# Patient Record
Sex: FEMALE | Race: WHITE | Employment: FULL TIME | URBAN - METROPOLITAN AREA
[De-identification: names, ages, dates, MRNs, and addresses within clinical notes are randomized per-mention and may not be internally consistent; named-entity substitution may affect disease eponyms.]

---

## 2017-01-04 PROBLEM — F41.9 ANXIETY: Status: ACTIVE | Noted: 2017-01-04

## 2017-04-11 PROBLEM — B37.31 VAGINAL YEAST INFECTION: Status: ACTIVE | Noted: 2017-04-11

## 2017-05-02 ENCOUNTER — APPOINTMENT (OUTPATIENT)
Dept: ULTRASOUND IMAGING | Age: 27
End: 2017-05-02
Payer: COMMERCIAL

## 2017-05-02 ENCOUNTER — APPOINTMENT (OUTPATIENT)
Dept: CT IMAGING | Age: 27
End: 2017-05-02
Payer: COMMERCIAL

## 2017-05-02 ENCOUNTER — HOSPITAL ENCOUNTER (OUTPATIENT)
Age: 27
Setting detail: OBSERVATION
Discharge: HOME | End: 2017-05-04
Attending: STUDENT IN AN ORGANIZED HEALTH CARE EDUCATION/TRAINING PROGRAM | Admitting: INTERNAL MEDICINE
Payer: COMMERCIAL

## 2017-05-02 ENCOUNTER — APPOINTMENT (OUTPATIENT)
Dept: GENERAL RADIOLOGY | Age: 27
End: 2017-05-02
Attending: INTERNAL MEDICINE
Payer: COMMERCIAL

## 2017-05-02 DIAGNOSIS — N71.9 ENDOMETRITIS: Primary | ICD-10-CM

## 2017-05-02 DIAGNOSIS — N76.0 BV (BACTERIAL VAGINOSIS): ICD-10-CM

## 2017-05-02 DIAGNOSIS — B96.89 BV (BACTERIAL VAGINOSIS): ICD-10-CM

## 2017-05-02 DIAGNOSIS — R10.9 INTRACTABLE ABDOMINAL PAIN: ICD-10-CM

## 2017-05-02 LAB
ALBUMIN SERPL BCP-MCNC: 4 G/DL (ref 3.5–5)
ALBUMIN/GLOB SERPL: 1 {RATIO} (ref 1.2–3.5)
ALP SERPL-CCNC: 72 U/L (ref 50–136)
ALT SERPL-CCNC: 28 U/L (ref 12–65)
ANION GAP BLD CALC-SCNC: 6 MMOL/L (ref 7–16)
APPEARANCE UR: CLEAR
AST SERPL W P-5'-P-CCNC: 20 U/L (ref 15–37)
BASOPHILS # BLD AUTO: 0 K/UL (ref 0–0.2)
BASOPHILS # BLD: 0 % (ref 0–2)
BILIRUB SERPL-MCNC: 0.3 MG/DL (ref 0.2–1.1)
BILIRUB UR QL: NEGATIVE
BUN SERPL-MCNC: 14 MG/DL (ref 6–23)
CALCIUM SERPL-MCNC: 9.1 MG/DL (ref 8.3–10.4)
CHLORIDE SERPL-SCNC: 104 MMOL/L (ref 98–107)
CO2 SERPL-SCNC: 28 MMOL/L (ref 21–32)
COLOR UR: YELLOW
CREAT SERPL-MCNC: 0.84 MG/DL (ref 0.6–1)
CRP SERPL-MCNC: 1 MG/DL (ref 0–0.9)
DIFFERENTIAL METHOD BLD: NORMAL
EOSINOPHIL # BLD: 0.1 K/UL (ref 0–0.8)
EOSINOPHIL NFR BLD: 1 % (ref 0.5–7.8)
ERYTHROCYTE [DISTWIDTH] IN BLOOD BY AUTOMATED COUNT: 13.2 % (ref 11.9–14.6)
ERYTHROCYTE [DISTWIDTH] IN BLOOD BY AUTOMATED COUNT: 13.3 % (ref 11.9–14.6)
GLOBULIN SER CALC-MCNC: 4.1 G/DL (ref 2.3–3.5)
GLUCOSE SERPL-MCNC: 88 MG/DL (ref 65–100)
GLUCOSE UR STRIP.AUTO-MCNC: NEGATIVE MG/DL
HCG UR QL: NEGATIVE
HCT VFR BLD AUTO: 35.4 % (ref 35.8–46.3)
HCT VFR BLD AUTO: 39.4 % (ref 35.8–46.3)
HEMOCCULT STL QL: NEGATIVE
HGB BLD-MCNC: 12 G/DL (ref 11.7–15.4)
HGB BLD-MCNC: 13.1 G/DL (ref 11.7–15.4)
HGB UR QL STRIP: NEGATIVE
IMM GRANULOCYTES # BLD: 0 K/UL (ref 0–0.5)
IMM GRANULOCYTES NFR BLD AUTO: 0 % (ref 0–5)
KETONES UR QL STRIP.AUTO: 15 MG/DL
LACTATE BLD-SCNC: 1.3 MMOL/L (ref 0.5–1.9)
LACTATE SERPL-SCNC: 0.6 MMOL/L (ref 0.4–2)
LEUKOCYTE ESTERASE UR QL STRIP.AUTO: NEGATIVE
LIPASE SERPL-CCNC: 99 U/L (ref 73–393)
LYMPHOCYTES # BLD AUTO: 40 % (ref 13–44)
LYMPHOCYTES # BLD: 1.8 K/UL (ref 0.5–4.6)
MCH RBC QN AUTO: 29.3 PG (ref 26.1–32.9)
MCH RBC QN AUTO: 29.6 PG (ref 26.1–32.9)
MCHC RBC AUTO-ENTMCNC: 33.2 G/DL (ref 31.4–35)
MCHC RBC AUTO-ENTMCNC: 33.9 G/DL (ref 31.4–35)
MCV RBC AUTO: 87.4 FL (ref 79.6–97.8)
MCV RBC AUTO: 88.1 FL (ref 79.6–97.8)
MONOCYTES # BLD: 0.4 K/UL (ref 0.1–1.3)
MONOCYTES NFR BLD AUTO: 10 % (ref 4–12)
NEUTS SEG # BLD: 2.1 K/UL (ref 1.7–8.2)
NEUTS SEG NFR BLD AUTO: 49 % (ref 43–78)
NITRITE UR QL STRIP.AUTO: NEGATIVE
PH UR STRIP: 6.5 [PH] (ref 5–9)
PLATELET # BLD AUTO: 195 K/UL (ref 150–450)
PLATELET # BLD AUTO: 227 K/UL (ref 150–450)
PMV BLD AUTO: 10.6 FL (ref 10.8–14.1)
PMV BLD AUTO: 10.9 FL (ref 10.8–14.1)
POTASSIUM SERPL-SCNC: 3.6 MMOL/L (ref 3.5–5.1)
PROT SERPL-MCNC: 8.1 G/DL (ref 6.3–8.2)
PROT UR STRIP-MCNC: NEGATIVE MG/DL
RBC # BLD AUTO: 4.05 M/UL (ref 4.05–5.25)
RBC # BLD AUTO: 4.47 M/UL (ref 4.05–5.25)
SERVICE CMNT-IMP: NORMAL
SODIUM SERPL-SCNC: 138 MMOL/L (ref 136–145)
SP GR UR REFRACTOMETRY: 1.07 (ref 1–1.02)
UROBILINOGEN UR QL STRIP.AUTO: 0.2 EU/DL (ref 0.2–1)
WBC # BLD AUTO: 4.4 K/UL (ref 4.3–11.1)
WBC # BLD AUTO: 5.8 K/UL (ref 4.3–11.1)
WET PREP GENITAL: NORMAL

## 2017-05-02 PROCEDURE — 71010 XR CHEST PORT: CPT

## 2017-05-02 PROCEDURE — 87210 SMEAR WET MOUNT SALINE/INK: CPT | Performed by: PHYSICIAN ASSISTANT

## 2017-05-02 PROCEDURE — 74011250637 HC RX REV CODE- 250/637: Performed by: PHYSICIAN ASSISTANT

## 2017-05-02 PROCEDURE — 74011636320 HC RX REV CODE- 636/320: Performed by: STUDENT IN AN ORGANIZED HEALTH CARE EDUCATION/TRAINING PROGRAM

## 2017-05-02 PROCEDURE — 74011250637 HC RX REV CODE- 250/637: Performed by: INTERNAL MEDICINE

## 2017-05-02 PROCEDURE — G0378 HOSPITAL OBSERVATION PER HR: HCPCS

## 2017-05-02 PROCEDURE — 83690 ASSAY OF LIPASE: CPT | Performed by: PHYSICIAN ASSISTANT

## 2017-05-02 PROCEDURE — 83605 ASSAY OF LACTIC ACID: CPT | Performed by: INTERNAL MEDICINE

## 2017-05-02 PROCEDURE — 99218 HC RM OBSERVATION: CPT

## 2017-05-02 PROCEDURE — 81025 URINE PREGNANCY TEST: CPT

## 2017-05-02 PROCEDURE — 74177 CT ABD & PELVIS W/CONTRAST: CPT

## 2017-05-02 PROCEDURE — 96376 TX/PRO/DX INJ SAME DRUG ADON: CPT

## 2017-05-02 PROCEDURE — 96361 HYDRATE IV INFUSION ADD-ON: CPT | Performed by: PHYSICIAN ASSISTANT

## 2017-05-02 PROCEDURE — 85025 COMPLETE CBC W/AUTO DIFF WBC: CPT | Performed by: PHYSICIAN ASSISTANT

## 2017-05-02 PROCEDURE — 74011000250 HC RX REV CODE- 250: Performed by: INTERNAL MEDICINE

## 2017-05-02 PROCEDURE — 36415 COLL VENOUS BLD VENIPUNCTURE: CPT | Performed by: INTERNAL MEDICINE

## 2017-05-02 PROCEDURE — 76856 US EXAM PELVIC COMPLETE: CPT

## 2017-05-02 PROCEDURE — 85027 COMPLETE CBC AUTOMATED: CPT | Performed by: INTERNAL MEDICINE

## 2017-05-02 PROCEDURE — 96372 THER/PROPH/DIAG INJ SC/IM: CPT

## 2017-05-02 PROCEDURE — 80053 COMPREHEN METABOLIC PANEL: CPT | Performed by: PHYSICIAN ASSISTANT

## 2017-05-02 PROCEDURE — 81003 URINALYSIS AUTO W/O SCOPE: CPT | Performed by: INTERNAL MEDICINE

## 2017-05-02 PROCEDURE — 86140 C-REACTIVE PROTEIN: CPT | Performed by: PHYSICIAN ASSISTANT

## 2017-05-02 PROCEDURE — 99285 EMERGENCY DEPT VISIT HI MDM: CPT | Performed by: PHYSICIAN ASSISTANT

## 2017-05-02 PROCEDURE — 87491 CHLMYD TRACH DNA AMP PROBE: CPT | Performed by: PHYSICIAN ASSISTANT

## 2017-05-02 PROCEDURE — 96375 TX/PRO/DX INJ NEW DRUG ADDON: CPT

## 2017-05-02 PROCEDURE — 74011250636 HC RX REV CODE- 250/636: Performed by: PHYSICIAN ASSISTANT

## 2017-05-02 PROCEDURE — 82270 OCCULT BLOOD FECES: CPT

## 2017-05-02 PROCEDURE — 81003 URINALYSIS AUTO W/O SCOPE: CPT | Performed by: PHYSICIAN ASSISTANT

## 2017-05-02 PROCEDURE — 74011000258 HC RX REV CODE- 258: Performed by: PHYSICIAN ASSISTANT

## 2017-05-02 PROCEDURE — 74011250636 HC RX REV CODE- 250/636: Performed by: INTERNAL MEDICINE

## 2017-05-02 PROCEDURE — 96365 THER/PROPH/DIAG IV INF INIT: CPT | Performed by: PHYSICIAN ASSISTANT

## 2017-05-02 PROCEDURE — 96375 TX/PRO/DX INJ NEW DRUG ADDON: CPT | Performed by: PHYSICIAN ASSISTANT

## 2017-05-02 PROCEDURE — 83605 ASSAY OF LACTIC ACID: CPT

## 2017-05-02 PROCEDURE — 74011000258 HC RX REV CODE- 258: Performed by: STUDENT IN AN ORGANIZED HEALTH CARE EDUCATION/TRAINING PROGRAM

## 2017-05-02 RX ORDER — HEPARIN SODIUM 5000 [USP'U]/ML
5000 INJECTION, SOLUTION INTRAVENOUS; SUBCUTANEOUS EVERY 12 HOURS
Status: DISCONTINUED | OUTPATIENT
Start: 2017-05-02 | End: 2017-05-04 | Stop reason: HOSPADM

## 2017-05-02 RX ORDER — AZITHROMYCIN 250 MG/1
1000 TABLET, FILM COATED ORAL
Status: COMPLETED | OUTPATIENT
Start: 2017-05-02 | End: 2017-05-02

## 2017-05-02 RX ORDER — SODIUM CHLORIDE 9 MG/ML
100 INJECTION, SOLUTION INTRAVENOUS CONTINUOUS
Status: DISCONTINUED | OUTPATIENT
Start: 2017-05-02 | End: 2017-05-04 | Stop reason: HOSPADM

## 2017-05-02 RX ORDER — TRAZODONE HYDROCHLORIDE 50 MG/1
150 TABLET ORAL
Status: DISCONTINUED | OUTPATIENT
Start: 2017-05-02 | End: 2017-05-04 | Stop reason: HOSPADM

## 2017-05-02 RX ORDER — KETOROLAC TROMETHAMINE 10 MG/1
10 TABLET, FILM COATED ORAL
Qty: 15 TAB | Refills: 0 | Status: SHIPPED | OUTPATIENT
Start: 2017-05-02 | End: 2017-05-04

## 2017-05-02 RX ORDER — SODIUM CHLORIDE 0.9 % (FLUSH) 0.9 %
10 SYRINGE (ML) INJECTION
Status: COMPLETED | OUTPATIENT
Start: 2017-05-02 | End: 2017-05-02

## 2017-05-02 RX ORDER — ONDANSETRON 2 MG/ML
4 INJECTION INTRAMUSCULAR; INTRAVENOUS
Status: COMPLETED | OUTPATIENT
Start: 2017-05-02 | End: 2017-05-02

## 2017-05-02 RX ORDER — DOXYCYCLINE HYCLATE 100 MG
100 TABLET ORAL 2 TIMES DAILY
Qty: 20 TAB | Refills: 0 | Status: SHIPPED | OUTPATIENT
Start: 2017-05-02 | End: 2017-05-12

## 2017-05-02 RX ORDER — OXYCODONE HYDROCHLORIDE 5 MG/1
5 TABLET ORAL
Status: DISCONTINUED | OUTPATIENT
Start: 2017-05-02 | End: 2017-05-04 | Stop reason: HOSPADM

## 2017-05-02 RX ORDER — HYOSCYAMINE SULFATE 0.12 MG/1
0.12 TABLET SUBLINGUAL 3 TIMES DAILY
Status: DISCONTINUED | OUTPATIENT
Start: 2017-05-02 | End: 2017-05-04 | Stop reason: HOSPADM

## 2017-05-02 RX ORDER — ONDANSETRON 2 MG/ML
4 INJECTION INTRAMUSCULAR; INTRAVENOUS
Status: DISCONTINUED | OUTPATIENT
Start: 2017-05-02 | End: 2017-05-02

## 2017-05-02 RX ORDER — HYDROMORPHONE HYDROCHLORIDE 1 MG/ML
1 INJECTION, SOLUTION INTRAMUSCULAR; INTRAVENOUS; SUBCUTANEOUS
Status: COMPLETED | OUTPATIENT
Start: 2017-05-02 | End: 2017-05-02

## 2017-05-02 RX ORDER — FAMOTIDINE 10 MG/ML
20 INJECTION INTRAVENOUS EVERY 12 HOURS
Status: DISCONTINUED | OUTPATIENT
Start: 2017-05-02 | End: 2017-05-04 | Stop reason: HOSPADM

## 2017-05-02 RX ORDER — METRONIDAZOLE 500 MG/1
500 TABLET ORAL 2 TIMES DAILY
Qty: 20 TAB | Refills: 0 | Status: SHIPPED | OUTPATIENT
Start: 2017-05-02 | End: 2017-05-04

## 2017-05-02 RX ORDER — MORPHINE SULFATE 2 MG/ML
2 INJECTION, SOLUTION INTRAMUSCULAR; INTRAVENOUS
Status: DISCONTINUED | OUTPATIENT
Start: 2017-05-02 | End: 2017-05-04 | Stop reason: HOSPADM

## 2017-05-02 RX ORDER — ONDANSETRON 2 MG/ML
4 INJECTION INTRAMUSCULAR; INTRAVENOUS
Status: DISCONTINUED | OUTPATIENT
Start: 2017-05-02 | End: 2017-05-04 | Stop reason: HOSPADM

## 2017-05-02 RX ORDER — ONDANSETRON 8 MG/1
8 TABLET, ORALLY DISINTEGRATING ORAL
Status: COMPLETED | OUTPATIENT
Start: 2017-05-02 | End: 2017-05-02

## 2017-05-02 RX ORDER — KETOROLAC TROMETHAMINE 30 MG/ML
30 INJECTION, SOLUTION INTRAMUSCULAR; INTRAVENOUS
Status: COMPLETED | OUTPATIENT
Start: 2017-05-02 | End: 2017-05-02

## 2017-05-02 RX ADMIN — SODIUM CHLORIDE 1000 ML: 900 INJECTION, SOLUTION INTRAVENOUS at 09:47

## 2017-05-02 RX ADMIN — CEFTRIAXONE 1 G: 1 INJECTION, POWDER, FOR SOLUTION INTRAMUSCULAR; INTRAVENOUS at 15:20

## 2017-05-02 RX ADMIN — ONDANSETRON 4 MG: 2 INJECTION INTRAMUSCULAR; INTRAVENOUS at 22:43

## 2017-05-02 RX ADMIN — HYOSCYAMINE SULFATE 0.12 MG: 0.12 TABLET ORAL at 20:42

## 2017-05-02 RX ADMIN — ONDANSETRON 4 MG: 2 INJECTION INTRAMUSCULAR; INTRAVENOUS at 12:36

## 2017-05-02 RX ADMIN — HEPARIN SODIUM 5000 UNITS: 5000 INJECTION, SOLUTION INTRAVENOUS; SUBCUTANEOUS at 20:42

## 2017-05-02 RX ADMIN — FAMOTIDINE 20 MG: 10 INJECTION, SOLUTION INTRAVENOUS at 20:42

## 2017-05-02 RX ADMIN — ONDANSETRON 8 MG: 8 TABLET, ORALLY DISINTEGRATING ORAL at 15:28

## 2017-05-02 RX ADMIN — KETOROLAC TROMETHAMINE 30 MG: 30 INJECTION, SOLUTION INTRAMUSCULAR at 09:48

## 2017-05-02 RX ADMIN — AZITHROMYCIN 1000 MG: 250 TABLET, FILM COATED ORAL at 15:20

## 2017-05-02 RX ADMIN — Medication 2 MG: at 22:43

## 2017-05-02 RX ADMIN — ONDANSETRON 4 MG: 2 INJECTION INTRAMUSCULAR; INTRAVENOUS at 18:29

## 2017-05-02 RX ADMIN — Medication 10 ML: at 13:20

## 2017-05-02 RX ADMIN — Medication 2 MG: at 18:25

## 2017-05-02 RX ADMIN — TRAZODONE HYDROCHLORIDE 150 MG: 50 TABLET ORAL at 22:44

## 2017-05-02 RX ADMIN — HYDROMORPHONE HYDROCHLORIDE 1 MG: 1 INJECTION, SOLUTION INTRAMUSCULAR; INTRAVENOUS; SUBCUTANEOUS at 12:36

## 2017-05-02 RX ADMIN — SODIUM CHLORIDE 100 ML: 900 INJECTION, SOLUTION INTRAVENOUS at 13:20

## 2017-05-02 RX ADMIN — IOVERSOL 100 ML: 741 INJECTION INTRA-ARTERIAL; INTRAVENOUS at 13:19

## 2017-05-02 RX ADMIN — SODIUM CHLORIDE 100 ML/HR: 900 INJECTION, SOLUTION INTRAVENOUS at 18:26

## 2017-05-02 NOTE — Clinical Note
Return to the ED if any change or worse in anyway. No intercourse for at least ten days. Finish all the antibiotics and see Dr. Danielito Ayers this week for follow up. We will call you if the cultures show we need to change or add antibiotics.

## 2017-05-02 NOTE — H&P
[address]   HISTORY AND PHYSICAL       Name:  Sheba Mallory   MR#:  [id number]   :  1990   Account #:  [de-identified]   Date of Adm:  2017       TIME OF ADMISSION: 4 p.m. CHIEF COMPLAINT: Abdominal pain. HISTORY OF PRESENT ILLNESS: This is a 75-year-old female patient   who has a past medical history of interstitial cystitis,   previous cholecystectomy and endometrial ablation due to severe   vaginal bleeding 1 year ago who came into the emergency room   today with a chief complaint of abdominal pain. According to the patient, she has been having dull abdominal   pain localized in the right flank and right pelvic area for   about 5 days, however, the pain has not been extremely severe. This morning when she was taking a shower she had severe right   flank and pelvic pain to the point that she had to lay in bed   for several minutes. She felt very nauseated and she felt like   she was about to pass out. Her pain slightly improved and she   decided to go to work. While she was driving her car, she had to   pull over because of the severe pain and once she reached the   place where she works, she had another episode of severe pain   and she had a near syncopal episode. Because of that reason, her   manager decided to bring her into the emergency room. When she   arrived here, her vital signs were blood pressure 118/84, heart   rate 113, respiratory rate of 16, O2 saturation of 98%. She was   awake and alert and complaining of severe pain. Her initial   blood workup reported normal white blood cell count, normal   hemoglobin, normal lactic acid, negative pregnancy test. Her   kidney function was within normal limits. Lipase was normal and   liver function panel was within normal limits. Rapid urinary   test was done and did not show evidence of any infection.  Pelvic   exam was done and it was reported that she had pain in the right   adnexa; however, there was no evidence of purulent vaginal   secretion. Wet preparation was done and reported only 10-20   white blood cells with no evidence of any parasite. A CT scan of   the abdomen and pelvis was done and did not show evidence of any   acute intra-abdominal pathology. A pelvic ultrasound was done   and reported uterine fibroid, but no evidence of any acute   disease. General Surgery and Gynecology reviewed her images and   it was considered that she was not having any surgical situation   and for that reason, it was recommended that the patient was   placed under observation in the hospitalist service. REVIEW OF SYSTEMS: A review of 14 systems was performed and it   was negative except for the findings that are reported in the   HPI. PAST MEDICAL HISTORY   1. Anxiety. 2. Adult attention deficit hyperactivity disorder. 3. Cholecystitis. 4. Depression. 5. GERD. 6. History of endometrial ablation. 7. Vaginal yeast infection. 8. Interstitial cystitis. PAST SURGICAL HISTORY   1. Cholecystectomy. 2. Tubal ligation. 3. Colposcopy. SOCIAL HISTORY: Denies smoking, alcohol use or illicit drug use. FAMILY HISTORY: Positive for colon cancer, ovarian cancer,   thyroid disease. ALLERGIES: PHENERGAN. PHYSICAL EXAMINATION   VITAL SIGNS: Blood pressure 100/60, heart rate 72, respiratory   rate of 18, O2 saturation of 95%. EYES: PERRLA. EARS, NOSE, MOUTH AND THROAT: There is no evidence of pharyngeal   erythema, edema or purulent exudates. RESPIRATORY: Clear breath sounds bilaterally. CARDIOVASCULAR: Regular rate and rhythm with no murmurs. GASTROINTESTINAL: Abdomen is soft, but she has tenderness in the   right flank and right pelvic area. GENITOURINARY: Unremarkable. MUSCULOSKELETAL: No evidence of trauma. SKIN: No evidence of active skin lesions. NEUROLOGIC: The patient is alert and oriented x3, with no   evidence of focal weakness. PSYCHIATRIC: Normal mood. HEMATOLOGIC/LYMPHATIC/IMMUNOLOGIC: No evidence of active   bleeding. No abnormal lymphadenopathy. LABORATORIES AND IMAGE RESULTS: White blood cell count 4.4,   hemoglobin 13.1, platelet count 298. Sodium 138, potassium 3.6,   chloride 104, anion gap 6, glucose 88, BUN 14, creatinine 0.8,   calcium 9.1, bilirubin 0.3, ALT 28, AST 20, lipase 91. Wet   preparation, 10-20 white blood cells. CT scan of the abdomen and   pelvis, no evidence of any acute intra-abdominal pathology. Chest x-ray pending to be done. EKG pending to be done. ASSESSMENT: This is a 38-year-old female patient who came into   the emergency room with severe abdominal pain. She is still   having active abdominal pain, but no source of the problem has   been found. She is at high risk of further complications. She   will be placed under observation and her initial length of stay   is calculated to be less than 2 midnights. PLAN   1. Abdominal pain. Unclear etiology. The patient has a normal   white blood cell count, normal lactic acid, a normal CT scan of   abdomen and pelvis and does not seem like she is having a   dangerous condition. Appropriate urinalysis has been ordered and   I will also order antimuscarinic oral medication to rule out the   possibility of severe irritable bowel syndrome. We will repeat   another CBC and lactic acid at night and will repeat labs in the   morning. The patient will be kept on a clear liquid diet. 2. History of depression. We will resume her regular home   medication which is Vyvanse. She has been advised to bring her   home medication. She is using trazodone 150 mg at night. 3. Deep vein thrombosis prophylaxis. Subcutaneous heparin.         MD Giulia Cox / HOSEA   D:  05/02/2017   16:53   T:  05/02/2017   17:24   Job #:  [id number]

## 2017-05-02 NOTE — ED TRIAGE NOTES
Right flank pain for about one week but much worse this morning. Pt denies urinary symptoms. States she is also feeling nauseated.

## 2017-05-02 NOTE — LETTER
[facility] EMERGENCY DEPT 
[facility] [address] [place] [id number] [phone] Work/School Note Date: 5/2/2017 To Whom It May concern: 
 
Roscoe Arevalo was seen and treated today in the emergency room by the following provider(s): 
Attending Provider: Joss Nunez DO Physician Assistant: CYNTHIA Barboza. Danny Majano may return to work on 05/04/17. Sincerely, CYNTHIA Barboza

## 2017-05-02 NOTE — IP AVS SNAPSHOT
Antonia Christopher 
 
 
 [address] [facility] [address] [facility] 
[phone] Patient: Jonathon Scanlon MRN: [id number] VFV:[id number]/83/1855 You are allergic to the following Allergen Reactions Phenergan (Promethazine) Anaphylaxis Recent Documentation Height Weight Breastfeeding? BMI OB Status Smoking Status 1.6 m 77.1 kg No 30.11 kg/m2 Ablation Never Smoker Emergency Contacts Name Discharge Info Relation Home Work Mobile Mukesh Arevalo DISCHARGE CAREGIVER [3] Spouse [3] [phone] Zack Brenner  Sister [23] [phone] About your hospitalization You were admitted on:  May 2, 2017 You last received care in the:  [address] You were discharged on: May 4, 2017 Unit phone number:  [phone] Why you were hospitalized Your primary diagnosis was:  Not on File Providers Seen During Your Hospitalizations Provider Role Specialty Primary office phone Karla Howard DO Attending Provider Emergency Medicine [phone] Amanda Patiño MD Attending Provider Internal Medicine [phone] Your Primary Care Physician (PCP) Primary Care Physician Office Phone Office Fax Gitaleroy Griffinolivier [phone] Follow-up Information Follow up With Details Comments Contact Info Amanda Patiño MD   [address] [facility] 
[phone] Misbah Barrett MD In 1 week APPT. MAY 11th @ 1:40 [address] Primary Care [facility] [address] 
[phone] Your Appointments Thursday May 11, 2017  1:40 PM EDT Follow Up with Misbah Barrett MD  
[facility] Primary Care [facility]) [address] [place] [address] [address] [place] [id number] [phone] Current Discharge Medication List  
  
START taking these medications Dose & Instructions Dispensing Information Comments Morning Noon Evening Bedtime  
 docusate sodium 100 mg capsule Commonly known as:  Aixa Nicholas Your next dose is:  Tomorrow Dose:  100 mg Take 1 Cap by mouth daily for 30 days. Quantity:  30 Cap Refills:  0  
     
   
   
   
  
 doxycycline 100 mg tablet Commonly known as:  VIBRA-TABS Your next dose is: Today Dose:  100 mg Take 1 Tab by mouth two (2) times a day for 10 days. Quantity:  20 Tab Refills:  0  
     
   
   
  
   
  
 fluconazole 150 mg tablet Commonly known as:  DIFLUCAN Dose:  150 mg Take 1 Tab by mouth once for 1 dose. FDA advises cautious prescribing of oral fluconazole in pregnancy. Quantity:  1 Tab Refills:  0  
     
   
   
   
  
 hyoscyamine SL 0.125 mg SL tablet Commonly known as:  LEVSIN/SL Your next dose is: Today Take 1 tablet 3 times daily before meals for 3 days, then  Take 1 tablet 2 times daily before meals for 3 days, then  Take 1 tablet 1 time daily before breakfast for 3 days, then stop or continue  Similar medication as per PCP instruction. Quantity:  18 Tab Refills:  0  
     
   
  
   
  
   
  
 ondansetron 4 mg disintegrating tablet Commonly known as:  ZOFRAN ODT Dose:  4 mg Take 1 Tab by mouth every eight (8) hours as needed for Nausea. Quantity:  30 Tab Refills:  1  
     
   
   
   
  
 oxyCODONE IR 5 mg immediate release tablet Commonly known as:  Magdi  Dose:  5 mg Take 1 Tab by mouth every eight (8) hours as needed. Max Daily Amount: 15 mg. Use only for severe pain Quantity:  30 Tab Refills:  0 CONTINUE these medications which have NOT CHANGED Dose & Instructions Dispensing Information Comments Morning Noon Evening Bedtime Lisdexamfetamine 50 mg Cap Commonly known as:  VYVANSE Your next dose is:  Tomorrow Dose:  50 mg Take 1 Cap (50 mg total) by mouth daily. Max Daily Amount: 50 mg  
 Quantity:  30 Cap Refills:  0  
     
   
   
   
  
 traZODone 150 mg tablet Commonly known as:  Timbo Granger Your next dose is: Today Dose:  150 mg Take 1 Tab by mouth nightly. Quantity:  30 Tab Refills:  5 STOP taking these medications DULoxetine 60 mg capsule Commonly known as:  CYMBALTA Mth-Me Blue-Sod Phos-PhSal-Hyo 118-10-40.8-36 mg Cap capsule Commonly known as:  Jayson Flash Where to Get Your Medications These medications were sent to [address] [place] [address] Hours:  24-hours Phone:  [phone]  
  doxycycline 100 mg tablet Information on where to get these meds will be given to you by the nurse or doctor. ! Ask your nurse or doctor about these medications  
  docusate sodium 100 mg capsule  
 fluconazole 150 mg tablet  
 hyoscyamine SL 0.125 mg SL tablet  
 ondansetron 4 mg disintegrating tablet  
 oxyCODONE IR 5 mg immediate release tablet Discharge Instructions DISCHARGE SUMMARY from Nurse The following personal items are in your possession at time of discharge: 
 
Dental Appliances: None Visual Aid: None Home Medications: None Jewelry: None Clothing: Pants, Shirt, Footwear, Socks Other Valuables: Cell Phone PATIENT INSTRUCTIONS: 
 
After general anesthesia or intravenous sedation, for 24 hours or while taking prescription Narcotics: · Limit your activities · Do not drive and operate hazardous machinery · Do not make important personal or business decisions · Do  not drink alcoholic beverages · If you have not urinated within 8 hours after discharge, please contact your surgeon on call. Report the following to your surgeon: 
· Excessive pain, swelling, redness or odor of or around the surgical area · Temperature over 100.5 · Nausea and vomiting lasting longer than 4 hours or if unable to take medications · Any signs of decreased circulation or nerve impairment to extremity: change in color, persistent  numbness, tingling, coldness or increase pain · Any questions What to do at Home: 
Recommended activity: Activity as tolerated, per MD 
 
If you experience any of the following symptoms provided to and reviewed with patient. fever>101, pain unrelieved with medication, nausea/vomiting, shortness of breath, dizziness/fainting, chest pain. . , please follow up with your doctor. *  Please give a list of your current medications to your Primary Care Provider. *  Please update this list whenever your medications are discontinued, doses are 
    changed, or new medications (including over-the-counter products) are added. *  Please carry medication information at all times in case of emergency situations. These are general instructions for a healthy lifestyle: No smoking/ No tobacco products/ Avoid exposure to second hand smoke Surgeon General's Warning:  Quitting smoking now greatly reduces serious risk to your health. Obesity, smoking, and sedentary lifestyle greatly increases your risk for illness A healthy diet, regular physical exercise & weight monitoring are important for maintaining a healthy lifestyle You may be retaining fluid if you have a history of heart failure or if you experience any of the following symptoms:  Weight gain of 3 pounds or more overnight or 5 pounds in a week, increased swelling in our hands or feet or shortness of breath while lying flat in bed. Please call your doctor as soon as you notice any of these symptoms; do not wait until your next office visit. Recognize signs and symptoms of STROKE: 
 
F-face looks uneven A-arms unable to move or move unevenly S-speech slurred or non-existent T-time-call 911 as soon as signs and symptoms begin-DO NOT go  
 Back to bed or wait to see if you get better-TIME IS BRAIN. Warning Signs of HEART ATTACK Call 911 if you have these symptoms: 
? Chest discomfort. Most heart attacks involve discomfort in the center of the chest that lasts more than a few minutes, or that goes away and comes back. It can feel like uncomfortable pressure, squeezing, fullness, or pain. ? Discomfort in other areas of the upper body. Symptoms can include pain or discomfort in one or both arms, the back, neck, jaw, or stomach. ? Shortness of breath with or without chest discomfort. ? Other signs may include breaking out in a cold sweat, nausea, or lightheadedness. Don't wait more than five minutes to call [address]! Fast action can save your life. Calling 911 is almost always the fastest way to get lifesaving treatment. Emergency Medical Services staff can begin treatment when they arrive  up to an hour sooner than if someone gets to the hospital by car. The discharge information has been reviewed with the patient. The patient verbalized understanding. Discharge medications reviewed with the patient and appropriate educational materials and side effects teaching were provided. Bacterial Vaginosis: Care Instructions Your Care Instructions Bacterial vaginosis is a type of vaginal infection. It is caused by excess growth of certain bacteria that are normally found in the vagina. Symptoms can include itching, swelling, pain when you urinate or have sex, and a gray or yellow discharge with a \"fishy\" odor. It is not considered an infection that is spread through sexual contact. Although symptoms can be annoying and uncomfortable, bacterial vaginosis does not usually cause other health problems. However, if you have it while you are pregnant, it can cause complications.  
While the infection may go away on its own, most doctors use antibiotics to treat it. You may have been prescribed pills or vaginal cream. With treatment, bacterial vaginosis usually clears up in 5 to 7 days. Follow-up care is a key part of your treatment and safety. Be sure to make and go to all appointments, and call your doctor if you are having problems. It's also a good idea to know your test results and keep a list of the medicines you take. How can you care for yourself at home? · Take your antibiotics as directed. Do not stop taking them just because you feel better. You need to take the full course of antibiotics. · Do not eat or drink anything that contains alcohol if you are taking metronidazole (Flagyl). · Keep using your medicine if you start your period. Use pads instead of tampons while using a vaginal cream or suppository. Tampons can absorb the medicine. · Wear loose cotton clothing. Do not wear nylon and other materials that hold body heat and moisture close to the skin. · Do not scratch. Relieve itching with a cold pack or a cool bath. · Do not wash your vaginal area more than once a day. Use plain water or a mild, unscented soap. Do not douche. When should you call for help? Watch closely for changes in your health, and be sure to contact your doctor if: 
· You have unexpected vaginal bleeding. · You have a fever. · You have new or increased pain in your vagina or pelvis. · You are not getting better after 1 week. · Your symptoms return after you finish the course of your medicine. Where can you learn more? Go to http://kaushik-gaby.info/. Cheryl Witt in the search box to learn more about \"Bacterial Vaginosis: Care Instructions. \" Current as of: October 13, 2016 Content Version: 11.2 © 4007-7904 [employer]. Care instructions adapted under license by [employer] (which disclaims liability or warranty for this information).  If you have questions about a medical condition or this instruction, always ask your healthcare professional. [address] any warranty or liability for your use of this information. Pelvic Inflammatory Disease: Care Instructions Your Care Instructions Pelvic inflammatory disease, or PID, is an infection of a womans fallopian tubes and other reproductive organs. PID is usually caused by a sexually transmitted infection (STI), such as gonorrhea or chlamydia. PID can cause scars in the fallopian tubes, making it hard for a woman to get pregnant. Having one STI increases your risk for other STIs, such as genital herpes, genital warts, syphilis, and HIV. It is important to take all the medicine that was prescribed. PID can cause serious health problems if you do not complete your treatment. Follow-up care is a key part of your treatment and safety. Be sure to make and go to all appointments, and call your doctor if you are having problems. Its also a good idea to know your test results and keep a list of the medicines you take. How can you care for yourself at home? · Take your antibiotics as directed. Do not stop taking them just because you feel better. You need to take the full course of antibiotics. · Rest until your fever and pain have improved. · Take pain medicines exactly as directed. ¨ If the doctor gave you a prescription medicine for pain, take it as prescribed. ¨ If you are not taking a prescription pain medicine, ask your doctor if you can take an over-the-counter medicine. · Use a hot water bottle or a heating pad (set on low) on your belly for pain. · Do not douche. · Do not have sex or use tampons (you can use pads instead) until you have taken all the medicine, your pain is gone, and you feel completely well. · Talk to any sex partners you have had in the past 2 months. They need to be tested and may need to be treated for STIs. To prevent STIs · Use latex condoms every time you have sex. Use them from the beginning to the end of sexual contact. · Talk to your partner before you have sex. Find out if he or she has or is at risk for any sexually transmitted infection (STI). Keep in mind that a person may be able to spread an STI even if he or she does not have symptoms. · Do not have sex with anyone who has symptoms of an STI, such as sores on the genitals or mouth. · Having one sex partner (who does not have STIs and does not have sex with anyone else) is a good way to avoid STIs. When should you call for help? Call your doctor now or seek immediate medical care if: 
· You have a new or higher fever. · Your pain gets worse. · You think you may be pregnant. Watch closely for changes in your health, and be sure to contact your doctor if: 
· You vomit or have diarrhea. · You are not getting better after 2 days. Where can you learn more? Go to http://kaushik-gaby.info/. Enter N294 in the search box to learn more about \"Pelvic Inflammatory Disease: Care Instructions. \" Current as of: October 13, 2016 Content Version: 11.2 © 0549-9881 [employer]. Care instructions adapted under license by [employer] (which disclaims liability or warranty for this information). If you have questions about a medical condition or this instruction, always ask your healthcare professional. [address] any warranty or liability for your use of this information. Discharge Orders None Introducing [facility] & HE[facility] SERVICES! Dear Gilbert Abel: Thank you for requesting a [employer] account. Our records indicate that you already have an active [employer] account. You can access your account anytime at [email or web address] Did you know that you can access your hospital and ER discharge instructions at any time in [employer]?   You can also review all of your test results from your hospital stay or ER visit. Additional Information If you have questions, please visit the Frequently Asked Questions section of the [employer] website at [email or web address] Remember, MyChart is NOT to be used for urgent needs. For medical emergencies, dial 911. Now available from your iPhone and Android! General Information Please provide this summary of care documentation to your next provider. Patient Signature:  ____________________________________________________________ Date:  ____________________________________________________________  
  
[facility] Provider Signature:  ____________________________________________________________ Date:  ____________________________________________________________

## 2017-05-02 NOTE — ED NOTES
TRANSFER - OUT REPORT:    Verbal report given to Sherry Fernandez RN on Aryan Sharpe  being transferred to [address] [place] [address] for routine progression of care       Report consisted of patients Situation, Background, Assessment and   Recommendations(SBAR). Information from the following report(s) ED Summary was reviewed with the receiving nurse. Lines:   Peripheral IV 05/02/17 Left Antecubital (Active)   Site Assessment Clean, dry, & intact 5/2/2017  9:37 AM   Phlebitis Assessment 0 5/2/2017  9:37 AM   Infiltration Assessment 0 5/2/2017  9:37 AM   Dressing Status Clean, dry, & intact 5/2/2017  9:37 AM   Dressing Type Tape;Transparent 5/2/2017  9:37 AM   Hub Color/Line Status Pink 5/2/2017  9:37 AM   Action Taken Blood drawn 5/2/2017  9:37 AM        Opportunity for questions and clarification was provided.       Patient transported with:   [employer]

## 2017-05-02 NOTE — ED PROVIDER NOTES
HPI Comments: Patient is here with some right lower quadrant pelvic pain that started about a week ago. She states that she has had one episode of nausea and vomiting due to the pain and the pain became significantly worse this morning at work. She felt like she was going to pass out due to the pain. She has had some increased vaginal discharge but no trouble with urination, hematuria, diarrhea, constipation, chest pain, shortness of breath, dizziness, weakness, fever or other symptoms. Her supervisor brought her in from work this morning and her  is here with her now. She did have a uterine ablation last year for heavy uterine bleeding as well as a cholecystectomy. She still has her appendix. Patient has had no swelling or weakness of her arms or legs and was ambulatory to the room without difficulty and well-hydrated. Patient is a 32 y.o. female presenting with pelvic pain. The history is provided by the patient. Pelvic Pain    This is a new problem. The current episode started more than 2 days ago. The problem occurs constantly. The problem has been gradually worsening. The pain is associated with vomiting. The pain is located in the RLQ. Associated symptoms include back pain. Pertinent negatives include no anorexia, no fever, no belching, no diarrhea, no flatus, no hematochezia, no melena, no nausea, no vomiting, no constipation, no dysuria, no frequency, no hematuria, no headaches, no arthralgias, no myalgias, no trauma and no chest pain. Nothing worsens the pain. The pain is relieved by nothing.         Past Medical History:   Diagnosis Date    Abnormal Pap smear of cervix 7/28/2016    Acalculous cholecystitis 3/3/2016    resolved with surgery     ADD (attention deficit disorder)     Adult ADHD 7/28/2016    Anxiety     Bowel trouble 7/28/2016    Depression     GERD (gastroesophageal reflux disease)     no meds currently     H/O seasonal allergies     History of chlamydia 7/28/2016  History of prior ablation treatment     pelvic    History of recurrent UTIs 7/28/2016    Insomnia 6/24/2016    Nausea & vomiting     Obese     bmi =32.8    Vaginal yeast infection 4/11/2017       Past Surgical History:   Procedure Laterality Date    HX CHOLECYSTECTOMY  3/16    HX COLPOSCOPY      HX TUBAL LIGATION  8/2012    HX WISDOM TEETH EXTRACTION           Family History:   Problem Relation Age of Onset    Thyroid Disease Mother     Breast Cancer Mother     Ovarian Cancer Mother     Ovarian Cancer Sister     Colon Cancer Maternal Grandmother        Social History     Social History    Marital status:      Spouse name: N/A    Number of children: N/A    Years of education: N/A     Occupational History    Not on file. Social History Main Topics    Smoking status: Never Smoker    Smokeless tobacco: Never Used    Alcohol use No    Drug use: No    Sexual activity: Yes     Partners: Male     Birth control/ protection: Surgical     Other Topics Concern    Not on file     Social History Narrative         ALLERGIES: Phenergan [promethazine]    Review of Systems   Constitutional: Negative. Negative for fever. HENT: Negative. Eyes: Negative. Respiratory: Negative. Cardiovascular: Negative. Negative for chest pain. Gastrointestinal: Negative. Negative for anorexia, constipation, diarrhea, flatus, hematochezia, melena, nausea and vomiting. Genitourinary: Positive for pelvic pain and vaginal discharge. Negative for decreased urine volume, difficulty urinating, dyspareunia, dysuria, enuresis, flank pain, frequency, genital sores, hematuria, menstrual problem, urgency, vaginal bleeding and vaginal pain. Musculoskeletal: Positive for back pain. Negative for arthralgias and myalgias. Skin: Negative. Neurological: Negative. Negative for headaches. Psychiatric/Behavioral: Negative. All other systems reviewed and are negative.       Vitals:    05/02/17 0859   BP: 118/84   Pulse: (!) 113   Resp: 16   Temp: 98 °F (36.7 °C)   SpO2: 100%   Weight: 77.1 kg (170 lb)   Height: 5' 3\" (1.6 m)            Physical Exam   Constitutional: She is oriented to person, place, and time. She appears well-developed and well-nourished. HENT:   Head: Normocephalic and atraumatic. Right Ear: External ear normal.   Left Ear: External ear normal.   Nose: Nose normal.   Mouth/Throat: Oropharynx is clear and moist.   Eyes: Conjunctivae and EOM are normal. Pupils are equal, round, and reactive to light. Neck: Normal range of motion. Neck supple. Cardiovascular: Normal rate, regular rhythm, normal heart sounds and intact distal pulses. Pulmonary/Chest: Effort normal and breath sounds normal.   Abdominal: Soft. Bowel sounds are normal.   Genitourinary: Rectal exam shows external hemorrhoid. Rectal exam shows no internal hemorrhoid, no fissure, no mass, no tenderness, anal tone normal and guaiac negative stool. There is no tenderness on the right labia. There is no tenderness on the left labia. Uterus is not deviated, not enlarged, not fixed and not tender. Cervix exhibits motion tenderness and discharge. Cervix exhibits no friability. Right adnexum displays tenderness (Palpation of the right adenexa reproduces the pain. ). Right adnexum displays no mass and no fullness. Left adnexum displays no mass, no tenderness and no fullness. Vaginal discharge found. Musculoskeletal: Normal range of motion. Neurological: She is alert and oriented to person, place, and time. She has normal reflexes. Skin: Skin is warm and dry. Psychiatric: She has a normal mood and affect. Her behavior is normal. Judgment and thought content normal.   Nursing note and vitals reviewed.        MDM  Number of Diagnoses or Management Options     Amount and/or Complexity of Data Reviewed  Clinical lab tests: ordered and reviewed  Tests in the radiology section of CPT®: ordered and reviewed  Discuss the patient with other providers: yes (Dr. Shravan Crocker)    Risk of Complications, Morbidity, and/or Mortality  Presenting problems: moderate  Diagnostic procedures: moderate  Management options: moderate      ED Course       Procedures    10:01 AM Spoke with Dr. Shravan Crocker regarding patient. 12:15 PM Patient is feeling a little better but would like something stronger for pain. I have informed her of the normal ultrasound results and the need for a CAT scan at this time. Patient is resting comfortably in the room. I have given her two warm blankets as well. 2:18 PM CT is back and normal, discussed patient again with Dr. Shravan Crocker and have asked him to go and examine patient. He is in there now. 2:35 PM Spoke with Dr. Winnie Bob regarding patient. Will call Dr. Marii Petersen. 2:43 PM Spoke with Dr. Marii Petersen regarding patient. He would like her to have pain medication and to see how she does with that as an outpatient. He suspects she probably had a cyst that ruptured and to send her home with pain medication and they will see her this week in the office. We will send her with antibiotics for the BV. She should return here sooner if worse in anyway or any change. 2:48 PM I have had surgery paged to run this by them. 2:52 PM Dr. Shravan Crocker reviewed CT and did not see any abnormalities. 3:05 PM Spoke with Dr. Rafael Hubbard, he is reviewing the CT scan now. He feels the uterus looks abnormal, it is enhancing abnormally. He feels she needs treatment for endometritis and follow up with GYN. Return to the ED if worsening or any change. Will treat with Doxycycline and flagyl and give rocephin and zithromax here prior to discharge  The patient was observed in the ED.     Results Reviewed:      Recent Results (from the past 24 hour(s))   HCG URINE, QL. - POC    Collection Time: 05/02/17  9:16 AM   Result Value Ref Range    Pregnancy test,urine (POC) NEGATIVE  NEG     WET PREP    Collection Time: 05/02/17  9:25 AM   Result Value Ref Range    Special Requests: NO SPECIAL REQUESTS      Wet prep 10 TO 20 WBC/HPF     Wet prep FEW  CLUE CELLS PRESENT        Wet prep NO YEAST SEEN      Wet prep NO TRICHOMONAS SEEN     CBC WITH AUTOMATED DIFF    Collection Time: 05/02/17  9:35 AM   Result Value Ref Range    WBC 4.4 4.3 - 11.1 K/uL    RBC 4.47 4.05 - 5.25 M/uL    HGB 13.1 11.7 - 15.4 g/dL    HCT 39.4 35.8 - 46.3 %    MCV 88.1 79.6 - 97.8 FL    MCH 29.3 26.1 - 32.9 PG    MCHC 33.2 31.4 - 35.0 g/dL    RDW 13.3 11.9 - 14.6 %    PLATELET 873 355 - 959 K/uL    MPV 10.9 10.8 - 14.1 FL    DF AUTOMATED      NEUTROPHILS 49 43 - 78 %    LYMPHOCYTES 40 13 - 44 %    MONOCYTES 10 4.0 - 12.0 %    EOSINOPHILS 1 0.5 - 7.8 %    BASOPHILS 0 0.0 - 2.0 %    IMMATURE GRANULOCYTES 0.0 0.0 - 5.0 %    ABS. NEUTROPHILS 2.1 1.7 - 8.2 K/UL    ABS. LYMPHOCYTES 1.8 0.5 - 4.6 K/UL    ABS. MONOCYTES 0.4 0.1 - 1.3 K/UL    ABS. EOSINOPHILS 0.1 0.0 - 0.8 K/UL    ABS. BASOPHILS 0.0 0.0 - 0.2 K/UL    ABS. IMM. GRANS. 0.0 0.0 - 0.5 K/UL   METABOLIC PANEL, COMPREHENSIVE    Collection Time: 05/02/17  9:35 AM   Result Value Ref Range    Sodium 138 136 - 145 mmol/L    Potassium 3.6 3.5 - 5.1 mmol/L    Chloride 104 98 - 107 mmol/L    CO2 28 21 - 32 mmol/L    Anion gap 6 (L) 7 - 16 mmol/L    Glucose 88 65 - 100 mg/dL    BUN 14 6 - 23 MG/DL    Creatinine 0.84 0.6 - 1.0 MG/DL    GFR est AA >60 >60 ml/min/1.73m2    GFR est non-AA >60 >60 ml/min/1.73m2    Calcium 9.1 8.3 - 10.4 MG/DL    Bilirubin, total 0.3 0.2 - 1.1 MG/DL    ALT (SGPT) 28 12 - 65 U/L    AST (SGOT) 20 15 - 37 U/L    Alk.  phosphatase 72 50 - 136 U/L    Protein, total 8.1 6.3 - 8.2 g/dL    Albumin 4.0 3.5 - 5.0 g/dL    Globulin 4.1 (H) 2.3 - 3.5 g/dL    A-G Ratio 1.0 (L) 1.2 - 3.5     LIPASE    Collection Time: 05/02/17  9:35 AM   Result Value Ref Range    Lipase 99 73 - 393 U/L   C REACTIVE PROTEIN, QT    Collection Time: 05/02/17  9:35 AM   Result Value Ref Range    C-Reactive protein 1.0 (H) 0.0 - 0.9 mg/dL   POC LACTIC ACID    Collection Time: 05/02/17  9:39 AM Result Value Ref Range    Lactic Acid (POC) 1.3 0.5 - 1.9 mmol/L   POC FECAL OCCULT BLOOD    Collection Time: 05/02/17  9:41 AM   Result Value Ref Range    Occult blood, stool (POC) NEGATIVE  NEG       CT ABD PELV W CONT   Final Result   IMPRESSION: No acute abdominal findings. US PELV NON OB W TV   Final Result   IMPRESSION:      1. 1.7 cm uterine nodule, likely a fibroid. Correlate with gynecological   history. 2. No adnexal masses. 3:26 PM I have spoke with Dr. Jerrell Sever regarding patient and he will admit patient due to intractable pain, exam inconsistent with blood work and diagnostic findings. He will come see her now. Rao Sood

## 2017-05-03 LAB
ANION GAP BLD CALC-SCNC: 9 MMOL/L (ref 7–16)
BUN SERPL-MCNC: 8 MG/DL (ref 6–23)
C TRACH RRNA SPEC QL NAA+PROBE: NEGATIVE
CALCIUM SERPL-MCNC: 7.9 MG/DL (ref 8.3–10.4)
CHLORIDE SERPL-SCNC: 106 MMOL/L (ref 98–107)
CO2 SERPL-SCNC: 26 MMOL/L (ref 21–32)
CREAT SERPL-MCNC: 0.83 MG/DL (ref 0.6–1)
ERYTHROCYTE [DISTWIDTH] IN BLOOD BY AUTOMATED COUNT: 12.9 % (ref 11.9–14.6)
GLUCOSE SERPL-MCNC: 74 MG/DL (ref 65–100)
HCT VFR BLD AUTO: 35.4 % (ref 35.8–46.3)
HGB BLD-MCNC: 11.5 G/DL (ref 11.7–15.4)
LACTATE SERPL-SCNC: 0.6 MMOL/L (ref 0.4–2)
MAGNESIUM SERPL-MCNC: 1.9 MG/DL (ref 1.8–2.4)
MCH RBC QN AUTO: 28.8 PG (ref 26.1–32.9)
MCHC RBC AUTO-ENTMCNC: 32.5 G/DL (ref 31.4–35)
MCV RBC AUTO: 88.5 FL (ref 79.6–97.8)
N GONORRHOEA RRNA SPEC QL NAA+PROBE: NEGATIVE
PLATELET # BLD AUTO: 198 K/UL (ref 150–450)
PMV BLD AUTO: 10.7 FL (ref 10.8–14.1)
POTASSIUM SERPL-SCNC: 3.9 MMOL/L (ref 3.5–5.1)
RBC # BLD AUTO: 4 M/UL (ref 4.05–5.25)
SODIUM SERPL-SCNC: 141 MMOL/L (ref 136–145)
SPECIMEN SOURCE: NORMAL
WBC # BLD AUTO: 3.5 K/UL (ref 4.3–11.1)

## 2017-05-03 PROCEDURE — 80048 BASIC METABOLIC PNL TOTAL CA: CPT | Performed by: INTERNAL MEDICINE

## 2017-05-03 PROCEDURE — 83605 ASSAY OF LACTIC ACID: CPT | Performed by: INTERNAL MEDICINE

## 2017-05-03 PROCEDURE — 85027 COMPLETE CBC AUTOMATED: CPT | Performed by: INTERNAL MEDICINE

## 2017-05-03 PROCEDURE — 74011250636 HC RX REV CODE- 250/636: Performed by: INTERNAL MEDICINE

## 2017-05-03 PROCEDURE — G0378 HOSPITAL OBSERVATION PER HR: HCPCS

## 2017-05-03 PROCEDURE — 96372 THER/PROPH/DIAG INJ SC/IM: CPT

## 2017-05-03 PROCEDURE — 96376 TX/PRO/DX INJ SAME DRUG ADON: CPT

## 2017-05-03 PROCEDURE — 74011000250 HC RX REV CODE- 250: Performed by: INTERNAL MEDICINE

## 2017-05-03 PROCEDURE — 83735 ASSAY OF MAGNESIUM: CPT | Performed by: INTERNAL MEDICINE

## 2017-05-03 PROCEDURE — 96366 THER/PROPH/DIAG IV INF ADDON: CPT

## 2017-05-03 PROCEDURE — 74011250637 HC RX REV CODE- 250/637: Performed by: INTERNAL MEDICINE

## 2017-05-03 PROCEDURE — 99218 HC RM OBSERVATION: CPT

## 2017-05-03 PROCEDURE — 74011000258 HC RX REV CODE- 258: Performed by: INTERNAL MEDICINE

## 2017-05-03 PROCEDURE — 36415 COLL VENOUS BLD VENIPUNCTURE: CPT | Performed by: INTERNAL MEDICINE

## 2017-05-03 RX ORDER — DOCUSATE SODIUM 100 MG/1
100 CAPSULE, LIQUID FILLED ORAL DAILY
Status: DISCONTINUED | OUTPATIENT
Start: 2017-05-04 | End: 2017-05-04 | Stop reason: HOSPADM

## 2017-05-03 RX ADMIN — TRAZODONE HYDROCHLORIDE 150 MG: 50 TABLET ORAL at 22:18

## 2017-05-03 RX ADMIN — OXYCODONE HYDROCHLORIDE 5 MG: 5 TABLET ORAL at 10:00

## 2017-05-03 RX ADMIN — ONDANSETRON 4 MG: 2 INJECTION INTRAMUSCULAR; INTRAVENOUS at 06:43

## 2017-05-03 RX ADMIN — FAMOTIDINE 20 MG: 10 INJECTION, SOLUTION INTRAVENOUS at 22:10

## 2017-05-03 RX ADMIN — ONDANSETRON 4 MG: 2 INJECTION INTRAMUSCULAR; INTRAVENOUS at 10:00

## 2017-05-03 RX ADMIN — Medication 2 MG: at 06:43

## 2017-05-03 RX ADMIN — HYOSCYAMINE SULFATE 0.12 MG: 0.12 TABLET ORAL at 22:18

## 2017-05-03 RX ADMIN — FAMOTIDINE 20 MG: 10 INJECTION, SOLUTION INTRAVENOUS at 08:59

## 2017-05-03 RX ADMIN — HEPARIN SODIUM 5000 UNITS: 5000 INJECTION, SOLUTION INTRAVENOUS; SUBCUTANEOUS at 08:58

## 2017-05-03 RX ADMIN — HYOSCYAMINE SULFATE 0.12 MG: 0.12 TABLET ORAL at 08:58

## 2017-05-03 RX ADMIN — HEPARIN SODIUM 5000 UNITS: 5000 INJECTION, SOLUTION INTRAVENOUS; SUBCUTANEOUS at 22:11

## 2017-05-03 RX ADMIN — ONDANSETRON 4 MG: 2 INJECTION INTRAMUSCULAR; INTRAVENOUS at 14:02

## 2017-05-03 RX ADMIN — CEFTRIAXONE 2 G: 2 INJECTION, POWDER, FOR SOLUTION INTRAMUSCULAR; INTRAVENOUS at 08:59

## 2017-05-03 RX ADMIN — ONDANSETRON 4 MG: 2 INJECTION INTRAMUSCULAR; INTRAVENOUS at 17:41

## 2017-05-03 RX ADMIN — HYOSCYAMINE SULFATE 0.12 MG: 0.12 TABLET ORAL at 17:14

## 2017-05-03 RX ADMIN — ONDANSETRON 4 MG: 2 INJECTION INTRAMUSCULAR; INTRAVENOUS at 22:11

## 2017-05-03 RX ADMIN — OXYCODONE HYDROCHLORIDE 5 MG: 5 TABLET ORAL at 02:05

## 2017-05-03 RX ADMIN — SODIUM CHLORIDE 100 ML/HR: 900 INJECTION, SOLUTION INTRAVENOUS at 06:48

## 2017-05-03 NOTE — PROGRESS NOTES
Pt complaining of nausea. Not yet time for Zofran. Dr. Evelina Nunez notified. Order received to change frequency to every 4 hours PRN.

## 2017-05-03 NOTE — PROGRESS NOTES
Assessment complete via flow sheet. Patient alert and oriented X 4. Respirations even and unlabored. Breath sounds clear. Abd soft and tender with active BS. Patient denies pain or nausea. IVF infusing with out difficulty. Family at bedside. Patient instructed to call with needs. Bed low/locked with call light with in reach.

## 2017-05-03 NOTE — PROGRESS NOTES
Progress Note    Patient: Regina De Los Santos MRN: [id number]  SSN: [id number]    YOB: 1990  Age: 32 y.o. Sex: female      Admit Date: 5/2/2017    LOS: 0 days     Subjective:     31 YO female admitted with abdominal pain. Unclear etiology. Possibly severe IBS. Objective:     Vitals:    05/03/17 0743 05/03/17 0956 05/03/17 1157 05/03/17 1510   BP: (!) 87/48 93/59 101/61 90/55   Pulse: 79  70 68   Resp: 16  16 16   Temp: 98.4 °F (36.9 °C)  97.5 °F (36.4 °C) 98.3 °F (36.8 °C)   SpO2: 96%  98% 100%   Weight:       Height:            Intake and Output:  Current Shift:    Last three shifts: 05/01 1901 - 05/03 0700  In: 911 [I.V.:911]  Out: 600 [Urine:600]    Physical Exam:   GENERAL: alert, cooperative, no distress, appears stated age  EYE: conjunctivae/corneas clear. PERRL, EOM's intact. Fundi benign  LYMPHATIC: Cervical, supraclavicular, and axillary nodes normal.   THROAT & NECK: normal and no erythema or exudates noted. LUNG: clear to auscultation bilaterally  HEART: regular rate and rhythm, S1, S2 normal, no murmur, click, rub or gallop  ABDOMEN: mild tenderness in the R flank   EXTREMITIES:  extremities normal, atraumatic, no cyanosis or edema  SKIN: Normal.  NEUROLOGIC: AOx3. Gait normal. Reflexes and motor strength normal and symmetric. Cranial nerves 2-12 and sensation grossly intact. PSYCHIATRIC: non focal    Lab/Data Review:  Lab results reviewed. For significant abnormal values and values requiring intervention, see assessment and plan. Assessment:     Active Problems:    * No active hospital problems. *      Plan:     1. Abdominal pain. Unclear etiology. -stable VS  -Blood workup is unremarkable   -high suspicion for severe IBS   -continue oral pain meds  -advanced to GI soft diet/ambulation was encouraged     2. History of depression. We will resume her regular home   medication which is Vyvanse. She has been advised to bring her   home medication.  She is using trazodone 150 mg at night. 3. Deep vein thrombosis prophylaxis.  Subcutaneous heparin.       Signed By: Arnulfo Zavala MD     May 3, 2017

## 2017-05-03 NOTE — PROGRESS NOTES
Encouraged ambulation. Pt stated that she would sit up in the chair for lunch and ambulate the halls after lunch. Will continue to monitor.

## 2017-05-03 NOTE — PROGRESS NOTES
Pt requested nausea medication prior to eating lunch administered zofran IV Per MD order, will continue to monitor.

## 2017-05-03 NOTE — PROGRESS NOTES
2 mg of Morphine given slow IVP for abd pain rated at a 7/10. 4 mg of Zofran given slow IVP for nausea. See MAR.

## 2017-05-03 NOTE — PROGRESS NOTES
Pt c/o 8/10 pain administered Roxicodone PO per MD order, will continue to monitor. Pt also c/o nausea administered zofran IV per MD order, will continue to monitor.

## 2017-05-03 NOTE — PROGRESS NOTES
Zofran ineffective for nausea. Pt vomited. Declines offer for RN to call doctor for additional orders.

## 2017-05-04 VITALS
OXYGEN SATURATION: 97 % | WEIGHT: 170 LBS | SYSTOLIC BLOOD PRESSURE: 98 MMHG | HEART RATE: 81 BPM | BODY MASS INDEX: 30.12 KG/M2 | TEMPERATURE: 97.5 F | RESPIRATION RATE: 16 BRPM | HEIGHT: 63 IN | DIASTOLIC BLOOD PRESSURE: 58 MMHG

## 2017-05-04 LAB
ALBUMIN SERPL BCP-MCNC: 3.2 G/DL (ref 3.5–5)
ALBUMIN/GLOB SERPL: 1 {RATIO} (ref 1.2–3.5)
ALP SERPL-CCNC: 58 U/L (ref 50–136)
ALT SERPL-CCNC: 21 U/L (ref 12–65)
ANION GAP BLD CALC-SCNC: 8 MMOL/L (ref 7–16)
AST SERPL W P-5'-P-CCNC: 19 U/L (ref 15–37)
BILIRUB DIRECT SERPL-MCNC: 0.1 MG/DL
BILIRUB SERPL-MCNC: 0.3 MG/DL (ref 0.2–1.1)
BUN SERPL-MCNC: 10 MG/DL (ref 6–23)
CALCIUM SERPL-MCNC: 8.3 MG/DL (ref 8.3–10.4)
CHLORIDE SERPL-SCNC: 108 MMOL/L (ref 98–107)
CO2 SERPL-SCNC: 26 MMOL/L (ref 21–32)
CREAT SERPL-MCNC: 0.82 MG/DL (ref 0.6–1)
ERYTHROCYTE [DISTWIDTH] IN BLOOD BY AUTOMATED COUNT: 12.9 % (ref 11.9–14.6)
GLOBULIN SER CALC-MCNC: 3.3 G/DL (ref 2.3–3.5)
GLUCOSE SERPL-MCNC: 88 MG/DL (ref 65–100)
HCT VFR BLD AUTO: 36 % (ref 35.8–46.3)
HGB BLD-MCNC: 12 G/DL (ref 11.7–15.4)
MAGNESIUM SERPL-MCNC: 2.1 MG/DL (ref 1.8–2.4)
MCH RBC QN AUTO: 29.3 PG (ref 26.1–32.9)
MCHC RBC AUTO-ENTMCNC: 33.3 G/DL (ref 31.4–35)
MCV RBC AUTO: 88 FL (ref 79.6–97.8)
PLATELET # BLD AUTO: 207 K/UL (ref 150–450)
PMV BLD AUTO: 10.6 FL (ref 10.8–14.1)
POTASSIUM SERPL-SCNC: 3.9 MMOL/L (ref 3.5–5.1)
PROT SERPL-MCNC: 6.5 G/DL (ref 6.3–8.2)
RBC # BLD AUTO: 4.09 M/UL (ref 4.05–5.25)
SODIUM SERPL-SCNC: 142 MMOL/L (ref 136–145)
WBC # BLD AUTO: 5.1 K/UL (ref 4.3–11.1)

## 2017-05-04 PROCEDURE — G0378 HOSPITAL OBSERVATION PER HR: HCPCS

## 2017-05-04 PROCEDURE — 83735 ASSAY OF MAGNESIUM: CPT | Performed by: INTERNAL MEDICINE

## 2017-05-04 PROCEDURE — 96372 THER/PROPH/DIAG INJ SC/IM: CPT

## 2017-05-04 PROCEDURE — 96376 TX/PRO/DX INJ SAME DRUG ADON: CPT

## 2017-05-04 PROCEDURE — 74011250636 HC RX REV CODE- 250/636: Performed by: INTERNAL MEDICINE

## 2017-05-04 PROCEDURE — 36415 COLL VENOUS BLD VENIPUNCTURE: CPT | Performed by: INTERNAL MEDICINE

## 2017-05-04 PROCEDURE — 74011000250 HC RX REV CODE- 250: Performed by: INTERNAL MEDICINE

## 2017-05-04 PROCEDURE — 85027 COMPLETE CBC AUTOMATED: CPT | Performed by: INTERNAL MEDICINE

## 2017-05-04 PROCEDURE — 74011000258 HC RX REV CODE- 258: Performed by: INTERNAL MEDICINE

## 2017-05-04 PROCEDURE — 96366 THER/PROPH/DIAG IV INF ADDON: CPT

## 2017-05-04 PROCEDURE — 74011250637 HC RX REV CODE- 250/637: Performed by: INTERNAL MEDICINE

## 2017-05-04 PROCEDURE — 80076 HEPATIC FUNCTION PANEL: CPT | Performed by: INTERNAL MEDICINE

## 2017-05-04 PROCEDURE — 99218 HC RM OBSERVATION: CPT

## 2017-05-04 PROCEDURE — 80048 BASIC METABOLIC PNL TOTAL CA: CPT | Performed by: INTERNAL MEDICINE

## 2017-05-04 RX ORDER — DOCUSATE SODIUM 100 MG/1
100 CAPSULE, LIQUID FILLED ORAL DAILY
Qty: 30 CAP | Refills: 0 | Status: SHIPPED | OUTPATIENT
Start: 2017-05-04 | End: 2017-06-03

## 2017-05-04 RX ORDER — HYOSCYAMINE SULFATE 0.12 MG/1
TABLET SUBLINGUAL
Qty: 18 TAB | Refills: 0 | Status: SHIPPED | OUTPATIENT
Start: 2017-05-04 | End: 2018-01-11 | Stop reason: ALTCHOICE

## 2017-05-04 RX ORDER — FLUCONAZOLE 150 MG/1
150 TABLET ORAL ONCE
Qty: 1 TAB | Refills: 0 | Status: SHIPPED | OUTPATIENT
Start: 2017-05-04 | End: 2017-05-04

## 2017-05-04 RX ORDER — ONDANSETRON 4 MG/1
4 TABLET, ORALLY DISINTEGRATING ORAL
Qty: 30 TAB | Refills: 1 | Status: SHIPPED | OUTPATIENT
Start: 2017-05-04 | End: 2017-07-13 | Stop reason: ALTCHOICE

## 2017-05-04 RX ORDER — OXYCODONE HYDROCHLORIDE 5 MG/1
5 TABLET ORAL
Qty: 30 TAB | Refills: 0 | Status: SHIPPED | OUTPATIENT
Start: 2017-05-04 | End: 2017-07-13 | Stop reason: ALTCHOICE

## 2017-05-04 RX ADMIN — FAMOTIDINE 20 MG: 10 INJECTION, SOLUTION INTRAVENOUS at 08:31

## 2017-05-04 RX ADMIN — CEFTRIAXONE 2 G: 2 INJECTION, POWDER, FOR SOLUTION INTRAMUSCULAR; INTRAVENOUS at 08:34

## 2017-05-04 RX ADMIN — HYOSCYAMINE SULFATE 0.12 MG: 0.12 TABLET ORAL at 08:31

## 2017-05-04 RX ADMIN — ONDANSETRON 4 MG: 2 INJECTION INTRAMUSCULAR; INTRAVENOUS at 07:18

## 2017-05-04 RX ADMIN — OXYCODONE HYDROCHLORIDE 5 MG: 5 TABLET ORAL at 07:23

## 2017-05-04 RX ADMIN — HEPARIN SODIUM 5000 UNITS: 5000 INJECTION, SOLUTION INTRAVENOUS; SUBCUTANEOUS at 08:32

## 2017-05-04 RX ADMIN — DOCUSATE SODIUM 100 MG: 100 CAPSULE, LIQUID FILLED ORAL at 08:31

## 2017-05-04 NOTE — PROGRESS NOTES
AM assessment completed. Pt alert and oriented x4. Pt resting in bed. Respirations even and unlabored, lung sounds clear. Abdomen soft, tender per pt. Bowel sounds active. Pt says she has not had a BM since two days before being admitted to hospital. Pt tolerating GI soft diet well, up ad dotty in room. IVF infusing, all safety measures in place. Encouraged the pt to call w/ needs.

## 2017-05-04 NOTE — PROGRESS NOTES
Assessment complete via flow sheet. Patient alert and oriented X 4. Respirations even and unlabored. Breath sounds clear. Abd soft and tender with active BS. Patient denies pain. IVF infusing with out difficulty. Patient instructed to call with needs. Bed low/locked with call light with in reach.

## 2017-05-04 NOTE — DISCHARGE INSTRUCTIONS
DISCHARGE SUMMARY from Nurse    The following personal items are in your possession at time of discharge:    Dental Appliances: None  Visual Aid: None     Home Medications: None  Jewelry: None  Clothing: Pants, Shirt, Footwear, Socks  Other Valuables: Cell Phone             PATIENT INSTRUCTIONS:    After general anesthesia or intravenous sedation, for 24 hours or while taking prescription Narcotics:  · Limit your activities  · Do not drive and operate hazardous machinery  · Do not make important personal or business decisions  · Do  not drink alcoholic beverages  · If you have not urinated within 8 hours after discharge, please contact your surgeon on call. Report the following to your surgeon:  · Excessive pain, swelling, redness or odor of or around the surgical area  · Temperature over 100.5  · Nausea and vomiting lasting longer than 4 hours or if unable to take medications  · Any signs of decreased circulation or nerve impairment to extremity: change in color, persistent  numbness, tingling, coldness or increase pain  · Any questions        What to do at Home:  Recommended activity: Activity as tolerated, per MD    If you experience any of the following symptoms provided to and reviewed with patient. fever>101, pain unrelieved with medication, nausea/vomiting, shortness of breath, dizziness/fainting, chest pain. . , please follow up with your doctor. *  Please give a list of your current medications to your Primary Care Provider. *  Please update this list whenever your medications are discontinued, doses are      changed, or new medications (including over-the-counter products) are added. *  Please carry medication information at all times in case of emergency situations.           These are general instructions for a healthy lifestyle:    No smoking/ No tobacco products/ Avoid exposure to second hand smoke    Surgeon General's Warning:  Quitting smoking now greatly reduces serious risk to your health. Obesity, smoking, and sedentary lifestyle greatly increases your risk for illness    A healthy diet, regular physical exercise & weight monitoring are important for maintaining a healthy lifestyle    You may be retaining fluid if you have a history of heart failure or if you experience any of the following symptoms:  Weight gain of 3 pounds or more overnight or 5 pounds in a week, increased swelling in our hands or feet or shortness of breath while lying flat in bed. Please call your doctor as soon as you notice any of these symptoms; do not wait until your next office visit. Recognize signs and symptoms of STROKE:    F-face looks uneven    A-arms unable to move or move unevenly    S-speech slurred or non-existent    T-time-call 911 as soon as signs and symptoms begin-DO NOT go       Back to bed or wait to see if you get better-TIME IS BRAIN. Warning Signs of HEART ATTACK     Call 911 if you have these symptoms:   Chest discomfort. Most heart attacks involve discomfort in the center of the chest that lasts more than a few minutes, or that goes away and comes back. It can feel like uncomfortable pressure, squeezing, fullness, or pain.  Discomfort in other areas of the upper body. Symptoms can include pain or discomfort in one or both arms, the back, neck, jaw, or stomach.  Shortness of breath with or without chest discomfort.  Other signs may include breaking out in a cold sweat, nausea, or lightheadedness. Don't wait more than five minutes to call 911 - MINUTES MATTER! Fast action can save your life. Calling 911 is almost always the fastest way to get lifesaving treatment. Emergency Medical Services staff can begin treatment when they arrive -- up to an hour sooner than if someone gets to the hospital by car. The discharge information has been reviewed with the patient. The patient verbalized understanding.     Discharge medications reviewed with the patient and appropriate educational materials and side effects teaching were provided. Bacterial Vaginosis: Care Instructions  Your Care Instructions    Bacterial vaginosis is a type of vaginal infection. It is caused by excess growth of certain bacteria that are normally found in the vagina. Symptoms can include itching, swelling, pain when you urinate or have sex, and a gray or yellow discharge with a \"fishy\" odor. It is not considered an infection that is spread through sexual contact. Although symptoms can be annoying and uncomfortable, bacterial vaginosis does not usually cause other health problems. However, if you have it while you are pregnant, it can cause complications. While the infection may go away on its own, most doctors use antibiotics to treat it. You may have been prescribed pills or vaginal cream. With treatment, bacterial vaginosis usually clears up in 5 to 7 days. Follow-up care is a key part of your treatment and safety. Be sure to make and go to all appointments, and call your doctor if you are having problems. It's also a good idea to know your test results and keep a list of the medicines you take. How can you care for yourself at home? · Take your antibiotics as directed. Do not stop taking them just because you feel better. You need to take the full course of antibiotics. · Do not eat or drink anything that contains alcohol if you are taking metronidazole (Flagyl). · Keep using your medicine if you start your period. Use pads instead of tampons while using a vaginal cream or suppository. Tampons can absorb the medicine. · Wear loose cotton clothing. Do not wear nylon and other materials that hold body heat and moisture close to the skin. · Do not scratch. Relieve itching with a cold pack or a cool bath. · Do not wash your vaginal area more than once a day. Use plain water or a mild, unscented soap. Do not douche. When should you call for help?   Watch closely for changes in your health, and be sure to contact your doctor if:  · You have unexpected vaginal bleeding. · You have a fever. · You have new or increased pain in your vagina or pelvis. · You are not getting better after 1 week. · Your symptoms return after you finish the course of your medicine. Where can you learn more? Go to http://kaushik-gaby.info/. Mo Guzman in the search box to learn more about \"Bacterial Vaginosis: Care Instructions. \"  Current as of: October 13, 2016  Content Version: 11.2  © 8498-0159 [employer]. Care instructions adapted under license by [employer] (which disclaims liability or warranty for this information). If you have questions about a medical condition or this instruction, always ask your healthcare professional. [address] any warranty or liability for your use of this information. Pelvic Inflammatory Disease: Care Instructions  Your Care Instructions    Pelvic inflammatory disease, or PID, is an infection of a womans fallopian tubes and other reproductive organs. PID is usually caused by a sexually transmitted infection (STI), such as gonorrhea or chlamydia. PID can cause scars in the fallopian tubes, making it hard for a woman to get pregnant. Having one STI increases your risk for other STIs, such as genital herpes, genital warts, syphilis, and HIV. It is important to take all the medicine that was prescribed. PID can cause serious health problems if you do not complete your treatment. Follow-up care is a key part of your treatment and safety. Be sure to make and go to all appointments, and call your doctor if you are having problems. Its also a good idea to know your test results and keep a list of the medicines you take. How can you care for yourself at home? · Take your antibiotics as directed. Do not stop taking them just because you feel better. You need to take the full course of antibiotics.   · Rest until your fever and pain have improved. · Take pain medicines exactly as directed. ¨ If the doctor gave you a prescription medicine for pain, take it as prescribed. ¨ If you are not taking a prescription pain medicine, ask your doctor if you can take an over-the-counter medicine. · Use a hot water bottle or a heating pad (set on low) on your belly for pain. · Do not douche. · Do not have sex or use tampons (you can use pads instead) until you have taken all the medicine, your pain is gone, and you feel completely well. · Talk to any sex partners you have had in the past 2 months. They need to be tested and may need to be treated for STIs. To prevent STIs  · Use latex condoms every time you have sex. Use them from the beginning to the end of sexual contact. · Talk to your partner before you have sex. Find out if he or she has or is at risk for any sexually transmitted infection (STI). Keep in mind that a person may be able to spread an STI even if he or she does not have symptoms. · Do not have sex with anyone who has symptoms of an STI, such as sores on the genitals or mouth. · Having one sex partner (who does not have STIs and does not have sex with anyone else) is a good way to avoid STIs. When should you call for help? Call your doctor now or seek immediate medical care if:  · You have a new or higher fever. · Your pain gets worse. · You think you may be pregnant. Watch closely for changes in your health, and be sure to contact your doctor if:  · You vomit or have diarrhea. · You are not getting better after 2 days. Where can you learn more? Go to http://kaushik-gaby.info/. Enter N294 in the search box to learn more about \"Pelvic Inflammatory Disease: Care Instructions. \"  Current as of: October 13, 2016  Content Version: 11.2  © 8973-0468 [employer], [employer].  Care instructions adapted under license by [employer] (which disclaims liability or warranty for this information). If you have questions about a medical condition or this instruction, always ask your healthcare professional. [place] [address] any warranty or liability for your use of this information.

## 2017-05-04 NOTE — PROGRESS NOTES
Discharge instructions and prescriptions provided and explained to the pt. Med side effect sheet reviewed. Opportunity for questions provided. Pt is getting dressed. Instructed to call once ready to leave.

## 2017-05-04 NOTE — DISCHARGE SUMMARY
Discharge Summary     Patient: Shruthi Gunn MRN: [id number]  SSN: [id number]    YOB: 1990  Age: 32 y.o. Sex: female       Admit Date: 5/2/2017    Discharge Date: 5/4/2017      Admission Diagnoses: abdominal pain    Discharge Diagnoses:   Problem List as of 5/4/2017  Date Reviewed: 4/11/2017          Codes Class Noted - Resolved    Vaginal yeast infection ICD-10-CM: B37.3  ICD-9-CM: 112.1  4/11/2017 - Present        Anxiety ICD-10-CM: F41.9  ICD-9-CM: 300.00  1/4/2017 - Present        Urinary frequency ICD-10-CM: R35.0  ICD-9-CM: 788.41  12/9/2016 - Present        Bladder pain ICD-10-CM: R39.89  ICD-9-CM: 788.99  12/9/2016 - Present        Dysuria ICD-10-CM: R30.0  ICD-9-CM: 788.1  12/9/2016 - Present        Abnormal Pap smear of cervix ICD-10-CM: R87.619  ICD-9-CM: 795.00  7/28/2016 - Present        History of recurrent UTIs ICD-10-CM: Z87.440  ICD-9-CM: V13.02  7/28/2016 - Present        Bowel trouble ICD-10-CM: K63.9  ICD-9-CM: 569.9  7/28/2016 - Present        Adult ADHD ICD-10-CM: F90.9  ICD-9-CM: 314.01  7/28/2016 - Present        History of chlamydia ICD-10-CM: Z86.19  ICD-9-CM: V12.09  7/28/2016 - Present        Menorrhagia ICD-10-CM: N92.0  ICD-9-CM: 626.2  7/27/2016 - Present        Insomnia ICD-10-CM: G47.00  ICD-9-CM: 780.52  6/24/2016 - Present        ADD (attention deficit disorder) ICD-10-CM: F98.8  ICD-9-CM: 314.00  4/28/2016 - Present        Acalculous cholecystitis ICD-10-CM: K81.9  ICD-9-CM: 575.10  3/3/2016 - Present               Discharge Condition: [facility] Course:      This is a 75-year-old female patient   who has a past medical history of interstitial cystitis,   previous cholecystectomy and endometrial ablation due to severe   vaginal bleeding 1 year ago who came into the emergency room    with a chief complaint of abdominal pain.     According to the patient, she had been having dull abdominal   pain localized in the right flank and right pelvic area for about 5 days, however, the pain had not been extremely severe. The morning of admission when she was taking a shower she had severe right   flank and pelvic pain to the point that she had to lay in bed   for several minutes. She felt very nauseated and she felt like   she was about to pass out. Her pain slightly improved and she   decided to go to work. While she was driving her car, she had to   pull over because of the severe pain and once she reached the   place where she works, she had another episode of severe pain   and she had another near syncopal episode. Because of that reason, her   manager decided to bring her into the emergency room. When she   arrived here, her vital signs were blood pressure 118/84, heart   rate 113, respiratory rate of 16, O2 saturation of 98%. She was   awake and alert and complaining of severe pain. Her initial   blood workup reported normal white blood cell count, normal   hemoglobin, normal lactic acid, negative pregnancy test. Her   kidney function was within normal limits. Lipase was normal and   liver function panel was within normal limits. Rapid urinary   test was done and did not show evidence of any infection. Pelvic   exam was done and it was reported that she had pain in the right   adnexa; however, there was no evidence of purulent vaginal   secretion. Wet preparation was done and reported only 10-20   white blood cells with no evidence of any parasite. A CT scan of   the abdomen and pelvis was done and did not show evidence of any   acute intra-abdominal pathology. A pelvic ultrasound was done   and reported uterine fibroid, but no evidence of any acute   disease. General Surgery and Gynecology reviewed her images and   it was considered that she was not having any surgical situation   and for that reason, it was recommended that the patient was   placed under observation in the hospitalist service.  She was started on anti-muscarinic medication ( hyoscyamine) TID and oxycodone PRN. She was empirically treated with IV ceftriaxone. She was observed for 48 h, her VS remained stable, her Blood workup was unremarkable and her pain improved. It was considered she had a severe episode of IBS and was advised to take a taper of hyoscyamine during the next 10 days. She will follow up with her PCP and most likely she will need to continue on some kind of antimuscarinic drug for some time. She was discharged in stable condition. GENERAL: alert, cooperative, no distress, appears stated age  EYE: conjunctivae/corneas clear. PERRL, EOM's intact. Fundi benign  LYMPHATIC: Cervical, supraclavicular, and axillary nodes normal.   THROAT & NECK: normal and no erythema or exudates noted. LUNG: clear to auscultation bilaterally  HEART: regular rate and rhythm, S1, S2 normal, no murmur, click, rub or gallop  ABDOMEN: mild tenderness in the R flank   EXTREMITIES: extremities normal, atraumatic, no cyanosis or edema  SKIN: Normal.  NEUROLOGIC: AOx3. Gait normal. Reflexes and motor strength normal and symmetric. Cranial nerves 2-12 and sensation grossly intact. PSYCHIATRIC: non focal    Consults: None    Significant Diagnostic Studies: see hospital course     Disposition: home    Discharge Medications:   No current facility-administered medications for this encounter. Current Outpatient Prescriptions:     docusate sodium (COLACE) 100 mg capsule, Take 1 Cap by mouth daily for 30 days. , Disp: 30 Cap, Rfl: 0    hyoscyamine SL (LEVSIN/SL) 0.125 mg SL tablet, Take 1 tablet 3 times daily before meals for 3 days, then  Take 1 tablet 2 times daily before meals for 3 days, then  Take 1 tablet 1 time daily before breakfast for 3 days, then stop or continue  Similar medication as per PCP instruction. , Disp: 18 Tab, Rfl: 0    oxyCODONE IR (ROXICODONE) 5 mg immediate release tablet, Take 1 Tab by mouth every eight (8) hours as needed. Max Daily Amount: 15 mg.  Use only for severe pain, Disp: 30 Tab, Rfl: 0    ondansetron (ZOFRAN ODT) 4 mg disintegrating tablet, Take 1 Tab by mouth every eight (8) hours as needed for Nausea., Disp: 30 Tab, Rfl: 1    fluconazole (DIFLUCAN) 150 mg tablet, Take 1 Tab by mouth once for 1 dose. FDA advises cautious prescribing of oral fluconazole in pregnancy. , Disp: 1 Tab, Rfl: 0    doxycycline (VIBRA-TABS) 100 mg tablet, Take 1 Tab by mouth two (2) times a day for 10 days. , Disp: 20 Tab, Rfl: 0    Lisdexamfetamine (VYVANSE) 50 mg cap, Take 1 Cap (50 mg total) by mouth daily. Max Daily Amount: 50 mg, Disp: 30 Cap, Rfl: 0    traZODone (DESYREL) 150 mg tablet, Take 1 Tab by mouth nightly., Disp: 30 Tab, Rfl: 5      Activity: Activity as tolerated  Diet: Low fat, Low cholesterol  Wound Care: None needed    Follow-up Appointments   Procedures    FOLLOW UP VISIT Appointment in: One Week PCP     PCP     Standing Status:   Standing     Number of Occurrences:   1     Order Specific Question:   Appointment in     Answer:    One Week       Signed By: Josias Scott MD     May 4, 2017

## 2017-05-04 NOTE — PROGRESS NOTES
Pt requested medicine for nausea and pain in abdomen/pelvis rated 5/10. Zofran 4mg given slow IVP and Roxicdone 5mg PO.

## 2017-08-05 ENCOUNTER — HOSPITAL ENCOUNTER (OUTPATIENT)
Dept: MAMMOGRAPHY | Age: 27
Discharge: HOME | End: 2017-08-05
Attending: FAMILY MEDICINE
Payer: COMMERCIAL

## 2017-08-05 DIAGNOSIS — Z80.3 FAMILY HISTORY OF BREAST CANCER IN MOTHER: ICD-10-CM

## 2017-08-05 DIAGNOSIS — Z12.31 ENCOUNTER FOR SCREENING MAMMOGRAM FOR BREAST CANCER: ICD-10-CM

## 2017-08-05 PROCEDURE — 77067 SCR MAMMO BI INCL CAD: CPT

## 2017-08-09 ENCOUNTER — HOSPITAL ENCOUNTER (OUTPATIENT)
Dept: LAB | Age: 27
Discharge: HOME | End: 2017-08-09

## 2017-08-09 PROCEDURE — 88305 TISSUE EXAM BY PATHOLOGIST: CPT | Performed by: INTERNAL MEDICINE

## 2017-08-09 PROCEDURE — 88312 SPECIAL STAINS GROUP 1: CPT | Performed by: INTERNAL MEDICINE

## 2017-08-15 ENCOUNTER — HOSPITAL ENCOUNTER (OUTPATIENT)
Dept: MAMMOGRAPHY | Age: 27
Discharge: HOME | End: 2017-08-15
Attending: FAMILY MEDICINE
Payer: COMMERCIAL

## 2017-08-15 DIAGNOSIS — Z80.3 FH: BREAST CANCER IN FIRST DEGREE RELATIVE WHEN <50 YEARS OLD: ICD-10-CM

## 2017-08-15 DIAGNOSIS — R92.8 ABNORMAL SCREENING MAMMOGRAM: ICD-10-CM

## 2017-08-15 PROCEDURE — 76642 ULTRASOUND BREAST LIMITED: CPT

## 2018-01-29 ENCOUNTER — HOSPITAL ENCOUNTER (OUTPATIENT)
Dept: SURGERY | Age: 28
Discharge: HOME | End: 2018-01-29

## 2018-01-29 VITALS — BODY MASS INDEX: 29.95 KG/M2 | HEIGHT: 63 IN | WEIGHT: 169 LBS

## 2018-01-29 NOTE — PERIOP NOTES
Patient verified name, , and surgery as listed in [facility]. Type 2 surgery phone assessment complete. Labs per surgeon: unknown; orders not received. Labs per anesthesia protocol: hgb. EKG: not needed per [facility] protocols. Patient informed of GYN class on 18 @ 11:45 at which time labs will be drawn. Patient will also receive all patient education and hibiclens soap to use per hospital policy. Patient instructed to hold all vitamins 7 days prior to surgery and NSAIDS 5 days prior to surgery, patient verbalized understanding. Patient instructed to continue previous medications as prescribed prior to surgery and to take the following medications the day of surgery according to anesthesia guidelines with a small sip of water: omeprazole, tramadol if needed, zofran if needed, vesicare if needed. Instructed to bring vyvanse, lunesta, omeprazole dos. Patient answered medical/surgical history questions at their best of ability. All prior to admission medications documented in [facility].

## 2018-02-02 ENCOUNTER — HOSPITAL ENCOUNTER (OUTPATIENT)
Dept: SURGERY | Age: 28
Discharge: HOME | End: 2018-02-02
Attending: OBSTETRICS & GYNECOLOGY
Payer: COMMERCIAL

## 2018-02-02 LAB — HGB BLD-MCNC: 13 G/DL (ref 11.7–15.4)

## 2018-02-02 PROCEDURE — 36415 COLL VENOUS BLD VENIPUNCTURE: CPT | Performed by: ANESTHESIOLOGY

## 2018-02-02 PROCEDURE — 85018 HEMOGLOBIN: CPT | Performed by: ANESTHESIOLOGY

## 2018-02-02 NOTE — PROGRESS NOTES
Patient attended GYN surgery orientation class today. Detailed instruction book regarding GYN surgery was provided at start of class. Class content included pre-operative instructions for surgery in the week prior to and day before surgery. Packet including Hibiclens and instructions on bathing was provided to patient. Detailed information was given regarding arriving at the hospital and instructions for the patient's day of surgery. Discussed recovery from surgery, hospital stay, pain management, and discharge. Reviewed recovery at home including pelvic rest, driving and activity restrictions, issues requiring call to physician etc. Jose Luis Borrego all questions in detail. Patient voices understanding of all.

## 2018-02-02 NOTE — PERIOP NOTES
Lab results within limits per anesthesia protocol; OK for surgery.      Recent Results (from the past 12 hour(s))   HEMOGLOBIN    Collection Time: 02/02/18 11:15 AM   Result Value Ref Range    HGB 13.0 11.7 - 15.4 g/dL

## 2018-02-14 ENCOUNTER — ANESTHESIA EVENT (OUTPATIENT)
Dept: SURGERY | Age: 28
End: 2018-02-14
Payer: COMMERCIAL

## 2018-02-15 ENCOUNTER — HOSPITAL ENCOUNTER (OUTPATIENT)
Age: 28
Setting detail: OBSERVATION
Discharge: HOME | End: 2018-02-16
Attending: OBSTETRICS & GYNECOLOGY | Admitting: OBSTETRICS & GYNECOLOGY
Payer: COMMERCIAL

## 2018-02-15 ENCOUNTER — ANESTHESIA (OUTPATIENT)
Dept: SURGERY | Age: 28
End: 2018-02-15
Payer: COMMERCIAL

## 2018-02-15 DIAGNOSIS — N92.0 MENORRHAGIA WITH REGULAR CYCLE: ICD-10-CM

## 2018-02-15 DIAGNOSIS — R10.2 PELVIC PAIN: Primary | ICD-10-CM

## 2018-02-15 LAB
ABO + RH BLD: NORMAL
BLOOD GROUP ANTIBODIES SERPL: NORMAL
HCG UR QL: NEGATIVE
SPECIMEN EXP DATE BLD: NORMAL

## 2018-02-15 PROCEDURE — 88307 TISSUE EXAM BY PATHOLOGIST: CPT | Performed by: OBSTETRICS & GYNECOLOGY

## 2018-02-15 PROCEDURE — 74011250636 HC RX REV CODE- 250/636: Performed by: ANESTHESIOLOGY

## 2018-02-15 PROCEDURE — 74011250636 HC RX REV CODE- 250/636

## 2018-02-15 PROCEDURE — 74011250636 HC RX REV CODE- 250/636: Performed by: OBSTETRICS & GYNECOLOGY

## 2018-02-15 PROCEDURE — 77030035044 HC TRCR ENDOSC VRSPRT BLDLSS COVD -C: Performed by: OBSTETRICS & GYNECOLOGY

## 2018-02-15 PROCEDURE — 77030008756 HC TU IRR SUC STRY -B: Performed by: OBSTETRICS & GYNECOLOGY

## 2018-02-15 PROCEDURE — 77030027138 HC INCENT SPIROMETER -A

## 2018-02-15 PROCEDURE — 77030020782 HC GWN BAIR PAWS FLX 3M -B: Performed by: ANESTHESIOLOGY

## 2018-02-15 PROCEDURE — 74011250637 HC RX REV CODE- 250/637: Performed by: ANESTHESIOLOGY

## 2018-02-15 PROCEDURE — 77030011640 HC PAD GRND REM COVD -A: Performed by: OBSTETRICS & GYNECOLOGY

## 2018-02-15 PROCEDURE — 74011000250 HC RX REV CODE- 250

## 2018-02-15 PROCEDURE — 77030032490 HC SLV COMPR SCD KNE COVD -B: Performed by: OBSTETRICS & GYNECOLOGY

## 2018-02-15 PROCEDURE — 76010000162 HC OR TIME 1.5 TO 2 HR INTENSV-TIER 1: Performed by: OBSTETRICS & GYNECOLOGY

## 2018-02-15 PROCEDURE — 77030034849: Performed by: OBSTETRICS & GYNECOLOGY

## 2018-02-15 PROCEDURE — 74011250637 HC RX REV CODE- 250/637: Performed by: OBSTETRICS & GYNECOLOGY

## 2018-02-15 PROCEDURE — 77030008703 HC TU ET UNCUF COVD -A: Performed by: ANESTHESIOLOGY

## 2018-02-15 PROCEDURE — 77030010507 HC ADH SKN DERMBND J&J -B: Performed by: OBSTETRICS & GYNECOLOGY

## 2018-02-15 PROCEDURE — 76060000035 HC ANESTHESIA 2 TO 2.5 HR: Performed by: OBSTETRICS & GYNECOLOGY

## 2018-02-15 PROCEDURE — 77030035048 HC TRCR ENDOSC OPTCL COVD -B: Performed by: OBSTETRICS & GYNECOLOGY

## 2018-02-15 PROCEDURE — 76210000017 HC OR PH I REC 1.5 TO 2 HR: Performed by: OBSTETRICS & GYNECOLOGY

## 2018-02-15 PROCEDURE — 99218 HC RM OBSERVATION: CPT

## 2018-02-15 PROCEDURE — 77030035035 HC TRCR ENDOSC VRSPRT V2 COVD -B: Performed by: OBSTETRICS & GYNECOLOGY

## 2018-02-15 PROCEDURE — 77030034154 HC SHR COAG HARM ACE J&J -F: Performed by: OBSTETRICS & GYNECOLOGY

## 2018-02-15 PROCEDURE — 77030018836 HC SOL IRR NACL ICUM -A: Performed by: OBSTETRICS & GYNECOLOGY

## 2018-02-15 PROCEDURE — 74011000250 HC RX REV CODE- 250: Performed by: ANESTHESIOLOGY

## 2018-02-15 PROCEDURE — 77030031139 HC SUT VCRL2 J&J -A: Performed by: OBSTETRICS & GYNECOLOGY

## 2018-02-15 PROCEDURE — 77030008477 HC STYL SATN SLP COVD -A: Performed by: ANESTHESIOLOGY

## 2018-02-15 PROCEDURE — 77030002933 HC SUT MCRYL J&J -A: Performed by: OBSTETRICS & GYNECOLOGY

## 2018-02-15 PROCEDURE — 86900 BLOOD TYPING SEROLOGIC ABO: CPT | Performed by: ANESTHESIOLOGY

## 2018-02-15 PROCEDURE — 77030035051: Performed by: OBSTETRICS & GYNECOLOGY

## 2018-02-15 PROCEDURE — 77030008522 HC TBNG INSUF LAPRO STRY -B: Performed by: OBSTETRICS & GYNECOLOGY

## 2018-02-15 PROCEDURE — 77030035236 HC SUT PDS STRATFX BARB J&J -B: Performed by: OBSTETRICS & GYNECOLOGY

## 2018-02-15 PROCEDURE — 74011000250 HC RX REV CODE- 250: Performed by: OBSTETRICS & GYNECOLOGY

## 2018-02-15 PROCEDURE — 81025 URINE PREGNANCY TEST: CPT

## 2018-02-15 RX ORDER — SODIUM CHLORIDE 0.9 % (FLUSH) 0.9 %
5-10 SYRINGE (ML) INJECTION AS NEEDED
Status: DISCONTINUED | OUTPATIENT
Start: 2018-02-15 | End: 2018-02-15 | Stop reason: HOSPADM

## 2018-02-15 RX ORDER — CEFAZOLIN SODIUM/WATER 2 G/20 ML
2 SYRINGE (ML) INTRAVENOUS ONCE
Status: COMPLETED | OUTPATIENT
Start: 2018-02-15 | End: 2018-02-15

## 2018-02-15 RX ORDER — LIDOCAINE HYDROCHLORIDE 10 MG/ML
0.1 INJECTION INFILTRATION; PERINEURAL AS NEEDED
Status: DISCONTINUED | OUTPATIENT
Start: 2018-02-15 | End: 2018-02-15 | Stop reason: HOSPADM

## 2018-02-15 RX ORDER — OXYCODONE HYDROCHLORIDE 5 MG/1
5 TABLET ORAL
Status: DISCONTINUED | OUTPATIENT
Start: 2018-02-15 | End: 2018-02-16 | Stop reason: HOSPADM

## 2018-02-15 RX ORDER — HYDROMORPHONE HYDROCHLORIDE 2 MG/ML
0.5 INJECTION, SOLUTION INTRAMUSCULAR; INTRAVENOUS; SUBCUTANEOUS
Status: DISCONTINUED | OUTPATIENT
Start: 2018-02-15 | End: 2018-02-15 | Stop reason: HOSPADM

## 2018-02-15 RX ORDER — EPINEPHRINE 1 MG/ML
INJECTION INTRAMUSCULAR; INTRAVENOUS; SUBCUTANEOUS AS NEEDED
Status: DISCONTINUED | OUTPATIENT
Start: 2018-02-15 | End: 2018-02-15 | Stop reason: HOSPADM

## 2018-02-15 RX ORDER — PROPOFOL 10 MG/ML
INJECTION, EMULSION INTRAVENOUS AS NEEDED
Status: DISCONTINUED | OUTPATIENT
Start: 2018-02-15 | End: 2018-02-15 | Stop reason: HOSPADM

## 2018-02-15 RX ORDER — FENTANYL CITRATE 50 UG/ML
INJECTION, SOLUTION INTRAMUSCULAR; INTRAVENOUS AS NEEDED
Status: DISCONTINUED | OUTPATIENT
Start: 2018-02-15 | End: 2018-02-15

## 2018-02-15 RX ORDER — HYDROMORPHONE HYDROCHLORIDE 2 MG/ML
0.5 INJECTION, SOLUTION INTRAMUSCULAR; INTRAVENOUS; SUBCUTANEOUS
Status: COMPLETED | OUTPATIENT
Start: 2018-02-15 | End: 2018-02-15

## 2018-02-15 RX ORDER — FLUMAZENIL 0.1 MG/ML
0.2 INJECTION INTRAVENOUS
Status: DISCONTINUED | OUTPATIENT
Start: 2018-02-15 | End: 2018-02-15 | Stop reason: HOSPADM

## 2018-02-15 RX ORDER — METHYLENE BLUE 10 MG/ML
INJECTION INTRAVENOUS AS NEEDED
Status: DISCONTINUED | OUTPATIENT
Start: 2018-02-15 | End: 2018-02-15 | Stop reason: HOSPADM

## 2018-02-15 RX ORDER — HYDROCODONE BITARTRATE AND ACETAMINOPHEN 7.5; 325 MG/1; MG/1
1 TABLET ORAL
Status: DISCONTINUED | OUTPATIENT
Start: 2018-02-15 | End: 2018-02-15

## 2018-02-15 RX ORDER — BUPIVACAINE HYDROCHLORIDE 5 MG/ML
INJECTION, SOLUTION EPIDURAL; INTRACAUDAL AS NEEDED
Status: DISCONTINUED | OUTPATIENT
Start: 2018-02-15 | End: 2018-02-15 | Stop reason: HOSPADM

## 2018-02-15 RX ORDER — KETOROLAC TROMETHAMINE 30 MG/ML
30 INJECTION, SOLUTION INTRAMUSCULAR; INTRAVENOUS EVERY 6 HOURS
Status: DISCONTINUED | OUTPATIENT
Start: 2018-02-15 | End: 2018-02-16 | Stop reason: HOSPADM

## 2018-02-15 RX ORDER — ONDANSETRON 2 MG/ML
INJECTION INTRAMUSCULAR; INTRAVENOUS AS NEEDED
Status: DISCONTINUED | OUTPATIENT
Start: 2018-02-15 | End: 2018-02-15 | Stop reason: HOSPADM

## 2018-02-15 RX ORDER — NALOXONE HYDROCHLORIDE 0.4 MG/ML
0.4 INJECTION, SOLUTION INTRAMUSCULAR; INTRAVENOUS; SUBCUTANEOUS AS NEEDED
Status: DISCONTINUED | OUTPATIENT
Start: 2018-02-15 | End: 2018-02-16 | Stop reason: HOSPADM

## 2018-02-15 RX ORDER — OXYCODONE HYDROCHLORIDE 5 MG/1
5 TABLET ORAL
Status: COMPLETED | OUTPATIENT
Start: 2018-02-15 | End: 2018-02-15

## 2018-02-15 RX ORDER — ZOLPIDEM TARTRATE 5 MG/1
5 TABLET ORAL
Status: DISCONTINUED | OUTPATIENT
Start: 2018-02-15 | End: 2018-02-16 | Stop reason: HOSPADM

## 2018-02-15 RX ORDER — NALBUPHINE HYDROCHLORIDE 10 MG/ML
5 INJECTION, SOLUTION INTRAMUSCULAR; INTRAVENOUS; SUBCUTANEOUS
Status: DISCONTINUED | OUTPATIENT
Start: 2018-02-15 | End: 2018-02-15 | Stop reason: HOSPADM

## 2018-02-15 RX ORDER — NEOSTIGMINE METHYLSULFATE 1 MG/ML
INJECTION INTRAVENOUS AS NEEDED
Status: DISCONTINUED | OUTPATIENT
Start: 2018-02-15 | End: 2018-02-15 | Stop reason: HOSPADM

## 2018-02-15 RX ORDER — NALOXONE HYDROCHLORIDE 0.4 MG/ML
0.1 INJECTION, SOLUTION INTRAMUSCULAR; INTRAVENOUS; SUBCUTANEOUS
Status: DISCONTINUED | OUTPATIENT
Start: 2018-02-15 | End: 2018-02-15 | Stop reason: HOSPADM

## 2018-02-15 RX ORDER — SCOLOPAMINE TRANSDERMAL SYSTEM 1 MG/1
1 PATCH, EXTENDED RELEASE TRANSDERMAL
Status: DISCONTINUED | OUTPATIENT
Start: 2018-02-15 | End: 2018-02-16 | Stop reason: HOSPADM

## 2018-02-15 RX ORDER — FENTANYL CITRATE 50 UG/ML
INJECTION, SOLUTION INTRAMUSCULAR; INTRAVENOUS AS NEEDED
Status: DISCONTINUED | OUTPATIENT
Start: 2018-02-15 | End: 2018-02-15 | Stop reason: HOSPADM

## 2018-02-15 RX ORDER — DEXAMETHASONE SODIUM PHOSPHATE 4 MG/ML
INJECTION, SOLUTION INTRA-ARTICULAR; INTRALESIONAL; INTRAMUSCULAR; INTRAVENOUS; SOFT TISSUE AS NEEDED
Status: DISCONTINUED | OUTPATIENT
Start: 2018-02-15 | End: 2018-02-15 | Stop reason: HOSPADM

## 2018-02-15 RX ORDER — ROCURONIUM BROMIDE 10 MG/ML
INJECTION, SOLUTION INTRAVENOUS AS NEEDED
Status: DISCONTINUED | OUTPATIENT
Start: 2018-02-15 | End: 2018-02-15 | Stop reason: HOSPADM

## 2018-02-15 RX ORDER — PROPOFOL 10 MG/ML
INJECTION, EMULSION INTRAVENOUS AS NEEDED
Status: DISCONTINUED | OUTPATIENT
Start: 2018-02-15 | End: 2018-02-15

## 2018-02-15 RX ORDER — DIPHENHYDRAMINE HYDROCHLORIDE 50 MG/ML
INJECTION, SOLUTION INTRAMUSCULAR; INTRAVENOUS AS NEEDED
Status: DISCONTINUED | OUTPATIENT
Start: 2018-02-15 | End: 2018-02-15 | Stop reason: HOSPADM

## 2018-02-15 RX ORDER — KETOROLAC TROMETHAMINE 30 MG/ML
INJECTION, SOLUTION INTRAMUSCULAR; INTRAVENOUS AS NEEDED
Status: DISCONTINUED | OUTPATIENT
Start: 2018-02-15 | End: 2018-02-15 | Stop reason: HOSPADM

## 2018-02-15 RX ORDER — SODIUM CHLORIDE, SODIUM LACTATE, POTASSIUM CHLORIDE, CALCIUM CHLORIDE 600; 310; 30; 20 MG/100ML; MG/100ML; MG/100ML; MG/100ML
100 INJECTION, SOLUTION INTRAVENOUS CONTINUOUS
Status: DISCONTINUED | OUTPATIENT
Start: 2018-02-15 | End: 2018-02-15 | Stop reason: HOSPADM

## 2018-02-15 RX ORDER — LIDOCAINE HYDROCHLORIDE 20 MG/ML
INJECTION, SOLUTION EPIDURAL; INFILTRATION; INTRACAUDAL; PERINEURAL AS NEEDED
Status: DISCONTINUED | OUTPATIENT
Start: 2018-02-15 | End: 2018-02-15 | Stop reason: HOSPADM

## 2018-02-15 RX ORDER — MIDAZOLAM HYDROCHLORIDE 1 MG/ML
2 INJECTION, SOLUTION INTRAMUSCULAR; INTRAVENOUS
Status: DISCONTINUED | OUTPATIENT
Start: 2018-02-15 | End: 2018-02-15 | Stop reason: HOSPADM

## 2018-02-15 RX ORDER — GLYCOPYRROLATE 0.2 MG/ML
INJECTION INTRAMUSCULAR; INTRAVENOUS AS NEEDED
Status: DISCONTINUED | OUTPATIENT
Start: 2018-02-15 | End: 2018-02-15 | Stop reason: HOSPADM

## 2018-02-15 RX ORDER — ACETAMINOPHEN 10 MG/ML
INJECTION, SOLUTION INTRAVENOUS AS NEEDED
Status: DISCONTINUED | OUTPATIENT
Start: 2018-02-15 | End: 2018-02-15 | Stop reason: HOSPADM

## 2018-02-15 RX ORDER — LIDOCAINE HYDROCHLORIDE 20 MG/ML
INJECTION, SOLUTION EPIDURAL; INFILTRATION; INTRACAUDAL; PERINEURAL AS NEEDED
Status: DISCONTINUED | OUTPATIENT
Start: 2018-02-15 | End: 2018-02-15

## 2018-02-15 RX ORDER — HYDROMORPHONE HYDROCHLORIDE 2 MG/ML
1 INJECTION, SOLUTION INTRAMUSCULAR; INTRAVENOUS; SUBCUTANEOUS
Status: DISCONTINUED | OUTPATIENT
Start: 2018-02-15 | End: 2018-02-16 | Stop reason: HOSPADM

## 2018-02-15 RX ADMIN — PROPOFOL 200 MG: 10 INJECTION, EMULSION INTRAVENOUS at 07:28

## 2018-02-15 RX ADMIN — ONDANSETRON 4 MG: 2 INJECTION INTRAMUSCULAR; INTRAVENOUS at 07:47

## 2018-02-15 RX ADMIN — HYDROMORPHONE HYDROCHLORIDE 0.5 MG: 2 INJECTION, SOLUTION INTRAMUSCULAR; INTRAVENOUS; SUBCUTANEOUS at 10:26

## 2018-02-15 RX ADMIN — KETOROLAC TROMETHAMINE 30 MG: 30 INJECTION, SOLUTION INTRAMUSCULAR at 22:13

## 2018-02-15 RX ADMIN — MIDAZOLAM HYDROCHLORIDE 2 MG: 1 INJECTION, SOLUTION INTRAMUSCULAR; INTRAVENOUS at 06:53

## 2018-02-15 RX ADMIN — ONDANSETRON 4 MG: 2 INJECTION INTRAMUSCULAR; INTRAVENOUS at 09:00

## 2018-02-15 RX ADMIN — KETOROLAC TROMETHAMINE 30 MG: 30 INJECTION, SOLUTION INTRAMUSCULAR at 15:56

## 2018-02-15 RX ADMIN — GLYCOPYRROLATE 0.2 MG: 0.2 INJECTION INTRAMUSCULAR; INTRAVENOUS at 09:16

## 2018-02-15 RX ADMIN — DIPHENHYDRAMINE HYDROCHLORIDE 6.25 MG: 50 INJECTION, SOLUTION INTRAMUSCULAR; INTRAVENOUS at 07:48

## 2018-02-15 RX ADMIN — OXYCODONE HYDROCHLORIDE 5 MG: 5 TABLET ORAL at 12:42

## 2018-02-15 RX ADMIN — FENTANYL CITRATE 100 MCG: 50 INJECTION, SOLUTION INTRAMUSCULAR; INTRAVENOUS at 07:28

## 2018-02-15 RX ADMIN — HYDROMORPHONE HYDROCHLORIDE 0.5 MG: 2 INJECTION, SOLUTION INTRAMUSCULAR; INTRAVENOUS; SUBCUTANEOUS at 09:45

## 2018-02-15 RX ADMIN — HYDROMORPHONE HYDROCHLORIDE 0.5 MG: 2 INJECTION, SOLUTION INTRAMUSCULAR; INTRAVENOUS; SUBCUTANEOUS at 09:34

## 2018-02-15 RX ADMIN — LIDOCAINE HYDROCHLORIDE 100 MG: 20 INJECTION, SOLUTION EPIDURAL; INFILTRATION; INTRACAUDAL; PERINEURAL at 07:28

## 2018-02-15 RX ADMIN — HYDROMORPHONE HYDROCHLORIDE 0.5 MG: 2 INJECTION, SOLUTION INTRAMUSCULAR; INTRAVENOUS; SUBCUTANEOUS at 09:56

## 2018-02-15 RX ADMIN — Medication 2 G: at 07:26

## 2018-02-15 RX ADMIN — SODIUM CHLORIDE, SODIUM LACTATE, POTASSIUM CHLORIDE, AND CALCIUM CHLORIDE 100 ML/HR: 600; 310; 30; 20 INJECTION, SOLUTION INTRAVENOUS at 06:28

## 2018-02-15 RX ADMIN — KETOROLAC TROMETHAMINE 30 MG: 30 INJECTION, SOLUTION INTRAMUSCULAR; INTRAVENOUS at 09:02

## 2018-02-15 RX ADMIN — LIDOCAINE HYDROCHLORIDE 0.1 ML: 10 INJECTION, SOLUTION INFILTRATION; PERINEURAL at 06:27

## 2018-02-15 RX ADMIN — ZOLPIDEM TARTRATE 5 MG: 5 TABLET ORAL at 20:51

## 2018-02-15 RX ADMIN — HYDROMORPHONE HYDROCHLORIDE 0.5 MG: 2 INJECTION, SOLUTION INTRAMUSCULAR; INTRAVENOUS; SUBCUTANEOUS at 09:29

## 2018-02-15 RX ADMIN — ACETAMINOPHEN 1000 MG: 10 INJECTION, SOLUTION INTRAVENOUS at 08:53

## 2018-02-15 RX ADMIN — HYDROMORPHONE HYDROCHLORIDE 1 MG: 2 INJECTION, SOLUTION INTRAMUSCULAR; INTRAVENOUS; SUBCUTANEOUS at 17:16

## 2018-02-15 RX ADMIN — NEOSTIGMINE METHYLSULFATE 2 MG: 1 INJECTION INTRAVENOUS at 09:16

## 2018-02-15 RX ADMIN — SODIUM CHLORIDE, SODIUM LACTATE, POTASSIUM CHLORIDE, AND CALCIUM CHLORIDE: 600; 310; 30; 20 INJECTION, SOLUTION INTRAVENOUS at 08:30

## 2018-02-15 RX ADMIN — DEXAMETHASONE SODIUM PHOSPHATE 10 MG: 4 INJECTION, SOLUTION INTRA-ARTICULAR; INTRALESIONAL; INTRAMUSCULAR; INTRAVENOUS; SOFT TISSUE at 07:47

## 2018-02-15 RX ADMIN — ROCURONIUM BROMIDE 50 MG: 10 INJECTION, SOLUTION INTRAVENOUS at 07:28

## 2018-02-15 RX ADMIN — HYDROMORPHONE HYDROCHLORIDE 1 MG: 2 INJECTION, SOLUTION INTRAMUSCULAR; INTRAVENOUS; SUBCUTANEOUS at 20:51

## 2018-02-15 NOTE — IP AVS SNAPSHOT
[address] [facility] 
 
 
 300 [facility] [address] 
[phone] Patient: Corbin Chauhan MRN: [id number] VEO:[id number] About your hospitalization You were admitted on:  February 15, 2018 You last received care in the:  [address] [facility] You were discharged on:  February 16, 2018 Why you were hospitalized Your primary diagnosis was:  Pelvic Pain Follow-up Information Follow up With Details Comments Contact Info Manjinder Gipson DO In 2 weeks Please call for follow-up appointment. [address] [place] [address] [address] [facility] [address] 
[phone] Your Scheduled Appointments Monday March 05, 2018  1:30 PM EST  
POST OP with Manjinder Gipson DO [facility] ([address]) [address] [place] [phone]  
[phone] Tuesday April 10, 2018  9:20 AM EDT Follow Up with Alejandra Hearn MD  
[facility] [address] [place] [id number] [phone] Discharge Orders None A check callie indicates which time of day the medication should be taken. My Medications START taking these medications Instructions Each Dose to Equal  
 Morning Noon Evening Bedtime  
 ibuprofen 800 mg tablet Commonly known as:  MOTRIN Your last dose was: Your next dose is: Take 1 Tab by mouth every eight (8) hours as needed for Pain. 800 mg  
    
   
   
   
  
 oxyCODONE IR 5 mg immediate release tablet Commonly known as:  [place] [employer] Your last dose was: Your next dose is: Take 1 Tab by mouth every four (4) hours as needed. Max Daily Amount: 30 mg.  
 5 mg CONTINUE taking these medications Instructions Each Dose to Equal  
 Morning Noon Evening Bedtime  
 eszopiclone 2 mg tablet Commonly known as:  Chris Bautista Your last dose was: Your next dose is: Take 1 tab po qhs prn insomnia Lisdexamfetamine 60 mg capsule Commonly known as:  VYVANSE Start taking on:  3/11/2018 Your last dose was: Your next dose is: Take 1 Cap (60 mg total) by mouth dailyEarliest Fill Date: 3/11/18. Max Daily Amount: 60 mg  
 60 mg  
    
   
   
   
  
 mupirocin 2 % ointment Commonly known as:  [facility] Your last dose was: Your next dose is:    
   
   
 apply to affected area twice a day  
     
   
   
   
  
 nystatin-triamcinolone topical cream  
Commonly known as:  MYCOLOG II Your last dose was: Your next dose is:    
   
   
 two (2) times daily as needed. omeprazole 40 mg capsule Commonly known as:  PRILOSEC Your last dose was: Your next dose is: Take 40 mg by mouth daily. Take / use AM day of surgery  per anesthesia protocols. Indications: gastroesophageal reflux disease 40 mg  
    
   
   
   
  
 ondansetron hcl 8 mg tablet Commonly known as:  ZOFRAN (AS HYDROCHLORIDE) Your last dose was: Your next dose is: Take 1 Tab by mouth every eight (8) hours as needed for Nausea. 8 mg VESIcare 5 mg tablet Generic drug:  solifenacin Your last dose was: Your next dose is: Take 5 mg by mouth daily as needed. 5 mg Where to Get Your Medications Information on where to get these meds will be given to you by the nurse or doctor. ! Ask your nurse or doctor about these medications  
  ibuprofen 800 mg tablet  
 oxyCODONE IR 5 mg immediate release tablet Discharge Instructions INSTRUCTIONS FOLLOWING HYSTEROSCOPY 
 
ACTIVITY  Limited activity today; increase as tolerated tomorrow, but no vigorous exercise  Shower only; no tub baths  No douches, tampons or intercourse until your doctor releases you (at least 2 weeks)  May return to work or school as directed by your doctor DIET  Clear liquids until no nausea or vomiting  Advance to regular diet as tolerated PAIN 
 Expect a moderate amount of pain.  Take pain medication as directed by your doctor. If no prescription for pain is sent     
   home with you, take the appropriate dose of your commonly used pain medication.  Call your doctor if pain is NOT relieved by medication.  DO NOT take aspirin or blood thinners until directed by your doctor. FOLLOW-UP PHONE CALLS  Calls will be made by nursing staff  If you have any problems, call your doctor as needed. CALL YOUR DOCTOR IF 
 Excessive bleeding that soaks a pad in an hour  Temperature of 101°F or above  Green or yellow, smelly discharge  Unable to urinate by bedtime  Nausea and vomiting that does not stop by bedtime AFTER ANESTHESIA  For the first 24 hours: DO NOT Drive, Drink alcoholic beverages, or Make important decisions.  Beware of dizziness following anesthesia and while taking pain medication.  Plan to stay tonight within 1 hours drive of the hospital 
 
 
 
 
  
  
  
[facility] Announcement We are excited to announce that we are making your provider's discharge notes available to you in [facility]. You will see these notes when they are completed and signed by the physician that discharged you from your recent hospital stay. If you have any questions or concerns about any information you see in [facility], please call the Health Information Department where you were seen or reach out to your Primary Care Provider for more information about your plan of care. Introducing \[facility] [place] [facility]\"" & HEALTH SERVICES! Dear Juan Pablo Gallardo: Thank you for requesting a [facility] account. Our records indicate that you already have an active [facility] account.   You can access your account anytime at [email or web address] Did you know that you can access your hospital and ER discharge instructions at any time in [employer]? You can also review all of your test results from your hospital stay or ER visit. Additional Information If you have questions, please visit the Frequently Asked Questions section of the [employer] website at [email or web address] Remember, [employer] is NOT to be used for urgent needs. For medical emergencies, dial 911. Now available from your iPhone and Android! Providers Seen During Your Hospitalization Provider Specialty Primary office phone Bao Meza DO Obstetrics & Gynecology [phone] Your Primary Care Physician (PCP) Primary Care Physician Office Phone Office Fax Annalise Stratton [phone] You are allergic to the following Allergen Reactions Phenergan (Promethazine) Anaphylaxis Recent Documentation Height Weight BMI OB Status Smoking Status 1.6 m 78.3 kg 30.59 kg/m2 Ablation Never Smoker Emergency Contacts Name Discharge Info Relation Home Work Mobile Mukesh Arevalo DISCHARGE CAREGIVER [3] Spouse [3] [phone] Chantel Chin  Sister [23] [phone] Patient Belongings The following personal items are in your possession at time of discharge: 
  Dental Appliances: None  Visual Aid: None      Home Medications: None   Jewelry: None  Clothing: Footwear, Pants, Shirt, Undergarments, Jacket/Coat    Other Valuables: None Please provide this summary of care documentation to your next provider. Signatures-by signing, you are acknowledging that this After Visit Summary has been reviewed with you and you have received a copy. Patient Signature:  ____________________________________________________________ Date:  ____________________________________________________________  
  
Adina Saver Provider Signature:  ____________________________________________________________ Date:  ____________________________________________________________

## 2018-02-15 NOTE — PERIOP NOTES
TRANSFER - OUT REPORT:    Verbal report given to Rod Man on [address]  being transferred to 4 th floor room 452 for routine progression of care       Report consisted of patients Situation, Background, Assessment and   Recommendations(SBAR). Information from the following report(s) SBAR, Intake/Output and MAR was reviewed with the receiving nurse. Opportunity for questions and clarification was provided.       Patient transported with:   O2 @ 2 liters  Tech

## 2018-02-15 NOTE — OP NOTES
[facility] REPORT    Aster Dalton  MR#: [id number]  : 1990  ACCOUNT #: [de-identified]   DATE OF SERVICE: 02/15/2018    PREOPERATIVE DIAGNOSES:    1.  Pelvic pain. 2.  Dyspareunia. 3.  Post-ablation syndrome. POSTOPERATIVE DIAGNOSES:  1.  Pelvic pain. 2.  Dyspareunia. 3.  Post-ablation syndrome    PROCEDURES PERFORMED:    1. Total laparoscopic hysterectomy. 2.  Bilateral salpingectomy. 3. cystoscopy    SURGEON:  Milka Stahl DO    ASSISTANT:  Dr. Roxanne Holm. ANESTHESIA:  General.     ESTIMATED BLOOD LOSS:  150 mL. IV FLUIDS:  1200 mL. URINE OUTPUT:  800 mL, clear throughout. SPECIMENS REMOVED:  Uterus, cervix, bilateral fallopian tubes sent to Pathology. COMPLICATIONS:  None. IMPLANTS:   none    FINDINGS:  Normal size uterus with normal tubes, normal ovaries, normal bladder mucosa, and serosa normal.  Clear yellow jet proportion of urine from bilateral ureteral orifices. DETAILS OF PROCEDURE:  After informed consent as obtained regarding the risks, benefits, and alternatives of the procedure, the patient was taken to the operating room. She was prepped and draped in normal, sterile fashion. Methylene blue sponge stick was placed into the vagina for manipulation. Infraumbilical incision was made, carried down through the underlying subcutaneous fat and a 5 mm Optiview port was placed into this incision with the camera in place. This Optiview port was inserted into the abdomen under direct visualization until the intraabdominal space was entered under direct visualization. Once this was done, this was then nonbladed port obviously. Once this was done, the port was hooked up to the gas and pneumoperitoneum was created. An 11 mm port on the left and a 5 mm port on the right with lidocaine were placed under direct visualization.   At this point, thorough inspection of the pelvis revealed the above-noted findings, normal liver edge, normal bladder, normal appendix, normal bowel, normal bladder edge, normal tubes, and normal ovaries. At this point, the tubes were grasped bilaterally with pickups. The mesosalpinx was cut and transected with Harmonic scalpel until the uterus was reached and the tubes were removed from the abdomen and sent to Pathology. The uteroovarian and round ligament cut. Pedicles were cut and transected with Harmonic scalpel. The remaining portion of broad ligament coagulated and transected until the end of the broad ligament was reached and the uterine artery pedicles could be identified. Anterior and posterior leafs of the broad ligament were . Uterine artery pedicles were cut and transected with Harmonic scalpel and the bladder was pushed inferiorly, anterior away from this plane of dissection. Once this was done, the remaining portions of the broad ligament were coagulated and transected with the main portion of the uterine artery pedicle. The bladder was well away from the plane of dissection. Anterior colpotomy and posterior colpotomy incisions were performed and this was then extended in a circumferential fashion until the uterus was amputated, delivered through this colpotomy incision and handed off and sent to Pathology. At this point, two lateral Vicryl stitches were placed at the angles with care to include the uterosacral ligaments and Stratafix, an extra one was placed on the left side to ensure the peritoneum was included and Stratafix was then used to close the remaining cuff. Once this was done, excellent hemostasis and closure was noted. The bulb was removed. No air leaked. Pressure was held. All air was removed. No bleeding was noted. The pelvis was filled with water. No bleeding was noted. No bubbles were noted. All pedicles were inspected and found to be hemostatic. There were no defects within the bowel or the bladder.   The pressure was dropped to zero and cystoscopy was performed revealing normal bladder mucosa without any defects or suture. No foreign material.  Clear yellow urine was noted to be jet propulsing through the bilateral ureteral orifices. At this point, attention was then returned to the abdomen. Again, no bleeding was noted. All pedicles were reinspected after irrigation, found to be hemostatic. Pressure was dropped to zero again, and no bleeding was noted. At this point, the ports were removed under direct visualization. The pneumoperitoneum was released and deflated. All ports were removed under direct visualization. The lateral 11 mm port was closed with 4-0 Monocryl on the skin. The other ports were closed with Dermabond. All lap, sponge, needle, and instrument counts were correct x2. There was no bleeding at the cuff site with the sponge stick placed in the vagina. All sponge sticks were removed.       DO FROY Nair / TN  D: 02/15/2018 09:28     T: 02/15/2018 11:20  JOB #: [id number]

## 2018-02-15 NOTE — BRIEF OP NOTE
BRIEF OPERATIVE NOTE    Date of Procedure: 2/15/2018   Preoperative Diagnosis: Pelvic pain [R10.2]  Dyspareunia in female [N94.10]  Postoperative Diagnosis: Pelvic pain [R10.2]  Dyspareunia in female [N94.10]    Procedure(s):   HYSTERECTOMY TOTAL LAPAROSCOPIC WITH BILATERAL SALPINGECTOMY  Surgeon(s) and Role:     * Bradley Jackson,  - Primary     * Jose Luna DO - Assisting         Assistant Staff: None      Surgical Staff:  Circ-1: True Bear RN  Circ-2: Ramon Carmichael RN  Circ-Relief: Ellie Ngo RN  Scrub Tech-1: Kathleen Camargo  Scrub Tech-2: Norman Rodriguez  Event Time In   Incision Start 5908   Incision Close 0903     Anesthesia: General   Estimated Blood Loss: 150cc  IVF 1200  UOP 800cc clear  Specimens:   ID Type Source Tests Collected by Time Destination   1 : Uterus, cervix, bilateral fallopian tubes Preservative Uterus  Bradley Jackson DO 2/15/2018 3260 Pathology      Findings: normal size uterus, normal tubes normal ovaries normal bladder mucosa with clear yellow jet propulsion of urine from bilateral ureteral orifices   Complications: none  Implants: * No implants in log *

## 2018-02-15 NOTE — ROUTINE PROCESS
SBAR IN Report: Mother    Verbal report received from [facility] on this patient, who is now being transferred from [facility]) for routine progression of care. Report consisted of patient's Situation, Background, Assessment and Recommendations (SBAR). Information from the SBAR and Procedure Summary was reviewed with the transferring nurse; opportunity for questions and clarification provided.

## 2018-02-15 NOTE — H&P
Gynecology Major Surgery History and Physical    Afshan Wanford  [id number]    Subjective:      Chief complaint:  chronic pelvic pain    Afshan Krueger is a 25 y.o.  female with a history of chronic pelvic pain. Previous treatment measures include nonsteroidal anti-inflammatory drugs (NSAIDs) and observation. Ultrasound findings include findings consistent with adenomyosis. She is admitted for Procedure(s) (LRB):  HYSTERECTOMY TOTAL LAPAROSCOPIC WITH BILATERAL SALPINGECTOMY (N/A)    The current method of family planning is tubal ligation. Past Medical History:   Diagnosis Date    Abnormal Pap smear of cervix 2016    Acalculous cholecystitis 3/3/2016    resolved with surgery     Adult ADHD 2016    Anxiety     Bowel trouble 2016    Depression     GERD (gastroesophageal reflux disease)     controlled with med     H/O seasonal allergies     History of prior ablation treatment     pelvic    History of recurrent UTIs 2016    Insomnia 2016    Interstitial cystitis 2017    Obese     bmi =29.9     Past Surgical History:   Procedure Laterality Date    HX CHOLECYSTECTOMY  3/16    HX COLPOSCOPY      HX HYSTEROSCOPY WITH ENDOMETRIAL ABLATION      D&C    HX OTHER SURGICAL  2016    CYSTOSCOPY WITH HYDRODISTENTION    HX TUBAL LIGATION  2012    HX WISDOM TEETH EXTRACTION       OB History      Para Term  AB Living    3 3 3       SAB TAB Ectopic Molar Multiple Live Births                   Allergies   Allergen Reactions    Phenergan [Promethazine] Anaphylaxis       Prior to Admission Medications   Prescriptions Last Dose Informant Patient Reported? Taking? Lisdexamfetamine (VYVANSE) 60 mg capsule 2018 at Unknown time  No Yes   Sig: Take 1 Cap (60 mg total) by mouth dailyEarliest Fill Date: 3/11/18. Max Daily Amount: 60 mg   VESICARE 5 mg tablet 2018 at Unknown time  Yes Yes   Sig: Take 5 mg by mouth daily as needed.    eszopiclone (LUNESTA) 2 mg tablet 2/14/2018 at Unknown time  No Yes   Sig: Take 1 tab po qhs prn insomnia   mupirocin (BACTROBAN) 2 % ointment 2/8/2018 at Unknown time  Yes Yes   Sig: apply to affected area twice a day   nystatin-triamcinolone (MYCOLOG II) topical cream 2/8/2018 at Unknown time  Yes Yes   Sig: two (2) times daily as needed. omeprazole (PRILOSEC) 40 mg capsule 2/14/2018 at Unknown time  Yes Yes   Sig: Take 40 mg by mouth daily. Take / use AM day of surgery  per anesthesia protocols. Indications: gastroesophageal reflux disease   ondansetron hcl (ZOFRAN, AS HYDROCHLORIDE,) 8 mg tablet 1/15/2018 at Unknown time  No Yes   Sig: Take 1 Tab by mouth every eight (8) hours as needed for Nausea. Facility-Administered Medications: None       Family History   Problem Relation Age of Onset    Thyroid Disease Mother     Breast Cancer Mother     Ovarian Cancer Mother     Ovarian Cancer Sister     Colon Cancer Maternal Grandmother      Social History     Social History    Marital status:      Spouse name: N/A    Number of children: N/A    Years of education: N/A     Occupational History    Not on file. Social History Main Topics    Smoking status: Never Smoker    Smokeless tobacco: Never Used    Alcohol use No    Drug use: No    Sexual activity: Yes     Partners: Male     Birth control/ protection: Surgical     Other Topics Concern    Not on file     Social History Narrative         Review of Systems:  Pertinent items are noted in HPI. Objective:     Vitals:    02/15/18 0551 02/15/18 0923   BP: 103/68 122/57   Pulse: 76 65   Resp: 16 18   Temp: 97.8 °F (36.6 °C) 97.2 °F (36.2 °C)   SpO2: 97% 100%   Weight: 172 lb 11.2 oz (78.3 kg)    Height: 5' 3\" (1.6 m)        Physical Exam:  General:  alert, cooperative, no distress, appears stated age                         Assessment:     pelvic pain with chronic pelvic pain       Plan:     1.  Procedure(s) (LRB):  HYSTERECTOMY TOTAL LAPAROSCOPIC WITH BILATERAL SALPINGECTOMY (N/A)  Discussed the risk of surgery including the risks of bleeding, infection, DVT, and surgical injuries to internal organs including but not limited to the bowels, bladder, rectum, and female reproductive organs. The patient understands the risks, any and all questions were answered to the patient's satisfaction.

## 2018-02-15 NOTE — PROGRESS NOTES
Admission assessment completed. Patient assisted up to bathroom to void 50 ml of clear yellow urine. Halima care instructed, patient verbalizes understanding. Gown changed, patient assisted back to bed.

## 2018-02-15 NOTE — ANESTHESIA POSTPROCEDURE EVALUATION
Post-Anesthesia Evaluation and Assessment    Patient: García Batista MRN: [id number]  SSN: [id number]    YOB: 1990  Age: 25 y.o. Sex: female       Cardiovascular Function/Vital Signs  Visit Vitals    /62 (BP 1 Location: Right arm, BP Patient Position: At rest)    Pulse 62    Temp 36.2 °C (97.2 °F)    Resp 16    Ht 5' 3\" (1.6 m)    Wt 78.3 kg (172 lb 11.2 oz)    SpO2 100%    BMI 30.59 kg/m2       Patient is status post general anesthesia for Procedure(s): HYSTERECTOMY TOTAL LAPAROSCOPIC WITH BILATERAL SALPINGECTOMY. Nausea/Vomiting: None    Postoperative hydration reviewed and adequate. Pain:  Pain Scale 1: Numeric (0 - 10) (02/15/18 0956)  Pain Intensity 1: 7 (02/15/18 0956)   Managed    Neurological Status:   Neuro (WDL): Exceptions to WDL (02/15/18 6591)  Neuro  Neurologic State: Drowsy (02/15/18 0562)  Orientation Level: Oriented to person;Oriented to situation (02/15/18 0594)  Cognition: Follows commands (02/15/18 1995)   At baseline    Mental Status and Level of Consciousness: Arousable    Pulmonary Status:   O2 Device: Nasal cannula (02/15/18 0938)   Adequate oxygenation and airway patent    Complications related to anesthesia: None    Post-anesthesia assessment completed.  No concerns    Signed By: Juan Tucker MD     February 15, 2018

## 2018-02-15 NOTE — ANESTHESIA PREPROCEDURE EVALUATION
Anesthetic History     PONV          Review of Systems / Medical History  Patient summary reviewed and pertinent labs reviewed    Pulmonary  Within defined limits                 Neuro/Psych         Psychiatric history     Cardiovascular  Within defined limits                Exercise tolerance: >4 METS  Comments: Denies recent CP, SOB or Palpitations   GI/Hepatic/Renal     GERD          Comments: Interstitial Cystitis Endo/Other        Obesity     Other Findings            Physical Exam    Airway  Mallampati: I  TM Distance: > 6 cm  Neck ROM: normal range of motion   Mouth opening: Normal     Cardiovascular  Regular rate and rhythm,  S1 and S2 normal,  no murmur, click, rub, or gallop             Dental  No notable dental hx       Pulmonary  Breath sounds clear to auscultation               Abdominal  GI exam deferred       Other Findings            Anesthetic Plan    ASA: 2  Anesthesia type: general          Induction: Intravenous  Anesthetic plan and risks discussed with: Patient      Pt allergic to Phenergan with what sounds like anaphylactic reaction. Also has h/o PONV. Will give pre-op Scopolamine patch and intraoperative Zofran/Decadron.      Anesthetic History   No history of anesthetic complications            Review of Systems / Medical History  Patient summary reviewed and pertinent labs reviewed    Pulmonary  Within defined limits                 Neuro/Psych         Psychiatric history     Cardiovascular                  Exercise tolerance: >4 METS     GI/Hepatic/Renal     GERD: well controlled           Endo/Other             Other Findings   Comments:            Physical Exam    Airway  Mallampati: I  TM Distance: 4 - 6 cm  Neck ROM: normal range of motion   Mouth opening: Normal     Cardiovascular  Regular rate and rhythm,  S1 and S2 normal,  no murmur, click, rub, or gallop  Rhythm: regular  Rate: normal         Dental         Pulmonary  Breath sounds clear to auscultation               Abdominal  GI exam deferred       Other Findings            Anesthetic Plan    ASA: 2  Anesthesia type: general          Induction: Intravenous  Anesthetic plan and risks discussed with: Patient

## 2018-02-16 VITALS
HEART RATE: 68 BPM | RESPIRATION RATE: 18 BRPM | TEMPERATURE: 98.5 F | HEIGHT: 63 IN | SYSTOLIC BLOOD PRESSURE: 88 MMHG | BODY MASS INDEX: 30.6 KG/M2 | OXYGEN SATURATION: 99 % | WEIGHT: 172.7 LBS | DIASTOLIC BLOOD PRESSURE: 49 MMHG

## 2018-02-16 LAB
BASOPHILS # BLD: 0 K/UL (ref 0–0.2)
BASOPHILS NFR BLD: 0 % (ref 0–2)
DIFFERENTIAL METHOD BLD: ABNORMAL
EOSINOPHIL # BLD: 0.1 K/UL (ref 0–0.8)
EOSINOPHIL NFR BLD: 1 % (ref 0.5–7.8)
ERYTHROCYTE [DISTWIDTH] IN BLOOD BY AUTOMATED COUNT: 12.9 % (ref 11.9–14.6)
HCT VFR BLD AUTO: 32.3 % (ref 35.8–46.3)
HGB BLD-MCNC: 10.9 G/DL (ref 11.7–15.4)
IMM GRANULOCYTES # BLD: 0 K/UL (ref 0–0.5)
IMM GRANULOCYTES NFR BLD AUTO: 0 % (ref 0–5)
LYMPHOCYTES # BLD: 2 K/UL (ref 0.5–4.6)
LYMPHOCYTES NFR BLD: 31 % (ref 13–44)
MCH RBC QN AUTO: 29.7 PG (ref 26.1–32.9)
MCHC RBC AUTO-ENTMCNC: 33.7 G/DL (ref 31.4–35)
MCV RBC AUTO: 88 FL (ref 79.6–97.8)
MONOCYTES # BLD: 0.6 K/UL (ref 0.1–1.3)
MONOCYTES NFR BLD: 9 % (ref 4–12)
NEUTS SEG # BLD: 3.8 K/UL (ref 1.7–8.2)
NEUTS SEG NFR BLD: 59 % (ref 43–78)
PLATELET # BLD AUTO: 213 K/UL (ref 150–450)
PMV BLD AUTO: 10.6 FL (ref 10.8–14.1)
RBC # BLD AUTO: 3.67 M/UL (ref 4.05–5.25)
WBC # BLD AUTO: 6.5 K/UL (ref 4.3–11.1)

## 2018-02-16 PROCEDURE — 36415 COLL VENOUS BLD VENIPUNCTURE: CPT | Performed by: OBSTETRICS & GYNECOLOGY

## 2018-02-16 PROCEDURE — 85025 COMPLETE CBC W/AUTO DIFF WBC: CPT | Performed by: OBSTETRICS & GYNECOLOGY

## 2018-02-16 PROCEDURE — 74011250637 HC RX REV CODE- 250/637: Performed by: OBSTETRICS & GYNECOLOGY

## 2018-02-16 PROCEDURE — 74011250636 HC RX REV CODE- 250/636: Performed by: OBSTETRICS & GYNECOLOGY

## 2018-02-16 PROCEDURE — 99218 HC RM OBSERVATION: CPT

## 2018-02-16 RX ORDER — SODIUM CHLORIDE 0.9 % (FLUSH) 0.9 %
5-10 SYRINGE (ML) INJECTION AS NEEDED
Status: DISCONTINUED | OUTPATIENT
Start: 2018-02-16 | End: 2018-02-16 | Stop reason: HOSPADM

## 2018-02-16 RX ORDER — IBUPROFEN 800 MG/1
800 TABLET ORAL
Qty: 40 TAB | Refills: 0 | Status: SHIPPED | OUTPATIENT
Start: 2018-02-16 | End: 2018-04-10 | Stop reason: ALTCHOICE

## 2018-02-16 RX ORDER — OXYCODONE HYDROCHLORIDE 5 MG/1
5 TABLET ORAL
Qty: 20 TAB | Refills: 0 | Status: SHIPPED | OUTPATIENT
Start: 2018-02-16 | End: 2018-04-10 | Stop reason: ALTCHOICE

## 2018-02-16 RX ORDER — SODIUM CHLORIDE 0.9 % (FLUSH) 0.9 %
5-10 SYRINGE (ML) INJECTION EVERY 8 HOURS
Status: DISCONTINUED | OUTPATIENT
Start: 2018-02-16 | End: 2018-02-16 | Stop reason: HOSPADM

## 2018-02-16 RX ADMIN — OXYCODONE HYDROCHLORIDE 5 MG: 5 TABLET ORAL at 01:11

## 2018-02-16 RX ADMIN — KETOROLAC TROMETHAMINE 30 MG: 30 INJECTION, SOLUTION INTRAMUSCULAR at 10:06

## 2018-02-16 RX ADMIN — Medication 10 ML: at 10:10

## 2018-02-16 RX ADMIN — KETOROLAC TROMETHAMINE 30 MG: 30 INJECTION, SOLUTION INTRAMUSCULAR at 03:56

## 2018-02-16 RX ADMIN — OXYCODONE HYDROCHLORIDE 5 MG: 5 TABLET ORAL at 05:12

## 2018-02-16 RX ADMIN — OXYCODONE HYDROCHLORIDE 5 MG: 5 TABLET ORAL at 10:06

## 2018-02-16 NOTE — PROGRESS NOTES
Pt requests pain medication for verbalized pain level of 8/10. 1mg Dilaudid given slow IV push. RN to reassess.

## 2018-02-16 NOTE — PROGRESS NOTES
Patient discharged after education completed and questions answered. Discharge instructions signed by patient, and copy provided to patient. Prescriptions given to patient. Pt ambulating without difficulty, declines wheelchair. Patient in stable condition at discharge.

## 2018-02-16 NOTE — PROGRESS NOTES
Report received from Nikolas Nath RN, and care assumed. Bedside report completed. Pt denies complaints at present.

## 2018-02-16 NOTE — PROGRESS NOTES
Gynecology Progress Note    Patient doing well post-op day 1 from Procedure(s): HYSTERECTOMY TOTAL LAPAROSCOPIC WITH BILATERAL SALPINGECTOMY without significant complaints. Pain controlled on current medication. Voiding without difficulty. Patient is not passing flatus. Vitals:  Blood pressure (!) 88/49, pulse 68, temperature 98.5 °F (36.9 °C), resp. rate 18, height 5' 3\" (1.6 m), weight 172 lb 11.2 oz (78.3 kg), SpO2 99 %. Temp (24hrs), Av.3 °F (36.8 °C), Min:97.9 °F (36.6 °C), Max:98.8 °F (37.1 °C)  Pt states BP always runs low recommend hydration and higher salt diet      Exam:  Patient without distress. Abdomen soft,  nontender. Incision dry and clean without erythema. Lower extremities are negative for swelling, cords, or tenderness. Incisions- X 3 C/D/I  No vaginal bleeding    Lab/Data Review:  CBC:   Lab Results   Component Value Date/Time    WBC 6.5 2018 07:00 AM    HGB 10.9 (L) 2018 07:00 AM    HCT 32.3 (L) 2018 07:00 AM     2018 07:00 AM       Assessment and Plan:  Patient appears to be having uncomplicated post Procedure(s): HYSTERECTOMY TOTAL LAPAROSCOPIC WITH BILATERAL SALPINGECTOMY course. Continue routine post-op care.  Desires discharge

## 2018-02-16 NOTE — DISCHARGE SUMMARY
Gynecological Discharge Summary     Patient ID:  Trent Ray  [id number]  25 y.o.  1990    Admit Date: 2/15/2018    Discharge Date: 2/16/2018     Admitting Physician: Genesis Almeida DO     Admission Diagnoses: Pelvic pain [R10.2]; Dyspareunia in female [N94.10]    Discharge Diagnoses: same as above      Additional Diagnoses: none        Hospital Course: By discharge date she was ambulating, voiding and without fever. Patient Instructions:   Current Discharge Medication List      START taking these medications    Details   oxyCODONE IR (ROXICODONE) 5 mg immediate release tablet Take 1 Tab by mouth every four (4) hours as needed. Max Daily Amount: 30 mg.  Qty: 20 Tab, Refills: 0    Associated Diagnoses: Pelvic pain      ibuprofen (MOTRIN) 800 mg tablet Take 1 Tab by mouth every eight (8) hours as needed for Pain. Qty: 40 Tab, Refills: 0    Associated Diagnoses: Pelvic pain         CONTINUE these medications which have NOT CHANGED    Details   nystatin-triamcinolone (MYCOLOG II) topical cream two (2) times daily as needed. Refills: 0      mupirocin (BACTROBAN) 2 % ointment apply to affected area twice a day  Refills: 0      eszopiclone (LUNESTA) 2 mg tablet Take 1 tab po qhs prn insomnia  Qty: 30 Tab, Refills: 3    Associated Diagnoses: Insomnia, unspecified type      Lisdexamfetamine (VYVANSE) 60 mg capsule Take 1 Cap (60 mg total) by mouth dailyEarliest Fill Date: 3/11/18. Max Daily Amount: 60 mg  Qty: 30 Cap, Refills: 0    Associated Diagnoses: Attention deficit disorder, unspecified hyperactivity presence      ondansetron hcl (ZOFRAN, AS HYDROCHLORIDE,) 8 mg tablet Take 1 Tab by mouth every eight (8) hours as needed for Nausea. Qty: 30 Tab, Refills: 0      VESICARE 5 mg tablet Take 5 mg by mouth daily as needed. Refills: 12      omeprazole (PRILOSEC) 40 mg capsule Take 40 mg by mouth daily. Take / use AM day of surgery  per anesthesia protocols.   Indications: gastroesophageal reflux disease             See discharge instructions provided by nursing. Follow-up in 2 weeks.     Martha Dan DO  2/16/2018  9:50 AM

## 2018-02-16 NOTE — PROGRESS NOTES
Assessment completed. POC reviewed with pt including MD and nursing staff rounding, pain management schedule and ambulation goals. Pt denies complaints at present.

## 2018-02-16 NOTE — PROGRESS NOTES
Pt requests prn pain medication but refuses Norco stating \"anything with hydrocodone makes me sick. \" Dr. Herbert Zamudio notified. Orders received via telephone with read back to d/c Norco and to administer 5mg Roxicodone PO q 4 hrs prn for moderate pain.

## 2018-02-16 NOTE — PROGRESS NOTES
Pt requests prn pain medication for verbalized pain level of 5/10. 5mg Roxicodone administered PO. RN to reassess.

## 2018-02-16 NOTE — PROGRESS NOTES
Shift assessment complete. Pt resting quietly in bed with spouse present. Pt denies further needs at this time and states she will call out with any questions or concerns.

## 2018-07-24 ENCOUNTER — APPOINTMENT (OUTPATIENT)
Dept: PHYSICAL THERAPY | Age: 28
End: 2018-07-24

## 2019-03-24 PROBLEM — F33.0 DEPRESSION, MAJOR, RECURRENT, MILD (HCC): Status: ACTIVE | Noted: 2019-03-24

## 2019-04-17 PROBLEM — E66.9 OBESITY, CLASS I, BMI 30-34.9: Status: ACTIVE | Noted: 2019-04-17

## 2021-01-06 ENCOUNTER — HOSPITAL ENCOUNTER (OUTPATIENT)
Dept: PHYSICAL THERAPY | Age: 31
Discharge: HOME | End: 2021-01-06

## 2021-01-06 NOTE — PROGRESS NOTES
Taya Arevalo  : 1990  Primary: [facility]  Secondary: [address] at [facility]  [address] , [address], [place]  [phone]   [phone]      OUTPATIENT DAILY NOTE    NAME/AGE/GENDER: Taya Arevalo is a 26 y.o. female. DATE: 2021    Ms. Arevalo CANCELED for today's appointment due to unknown reasons. Pt will call back to reschedule when ready.     Niki Rosario, PT, DPT

## 2022-03-18 PROBLEM — B37.31 VAGINAL YEAST INFECTION: Status: ACTIVE | Noted: 2017-04-11

## 2022-03-18 PROBLEM — R10.2 PELVIC PAIN: Status: ACTIVE | Noted: 2018-02-15

## 2022-03-19 PROBLEM — F33.0 DEPRESSION, MAJOR, RECURRENT, MILD (HCC): Status: ACTIVE | Noted: 2019-03-24

## 2022-03-19 PROBLEM — F41.9 ANXIETY: Status: ACTIVE | Noted: 2017-01-04

## 2022-03-20 PROBLEM — E66.9 OBESITY, CLASS I, BMI 30-34.9: Status: ACTIVE | Noted: 2019-04-17

## 2022-05-23 NOTE — PROGRESS NOTES
Pt assessment completed. Alert and oriented. Lungs CTA even and unlabored. Heart sounds regular. Abdomen soft and tender with active bowel sounds. IV present and infusing without difficulty. Pt denies pain needs at this time, all needs met will continue to monitor. Suspected by initial testing for prior portal vein thrombosis and SMA thrombosis  Was recommended for repeat testing per hem/onc    Per [facility] d/w moni adame was concerned for true Protein S deficiency

## 2023-01-08 ENCOUNTER — APPOINTMENT (OUTPATIENT)
Dept: GENERAL RADIOLOGY | Age: 33
End: 2023-01-08
Payer: COMMERCIAL

## 2023-01-08 ENCOUNTER — HOSPITAL ENCOUNTER (EMERGENCY)
Age: 33
Discharge: HOME | End: 2023-01-08
Attending: EMERGENCY MEDICINE
Payer: COMMERCIAL

## 2023-01-08 VITALS
SYSTOLIC BLOOD PRESSURE: 148 MMHG | BODY MASS INDEX: 40.75 KG/M2 | HEIGHT: 63 IN | RESPIRATION RATE: 18 BRPM | OXYGEN SATURATION: 93 % | HEART RATE: 110 BPM | WEIGHT: 230 LBS | TEMPERATURE: 97.6 F | DIASTOLIC BLOOD PRESSURE: 74 MMHG

## 2023-01-08 DIAGNOSIS — S90.02XA CONTUSION OF LEFT ANKLE, INITIAL ENCOUNTER: Primary | ICD-10-CM

## 2023-01-08 PROCEDURE — 73610 X-RAY EXAM OF ANKLE: CPT

## 2023-01-08 PROCEDURE — 99283 EMERGENCY DEPT VISIT LOW MDM: CPT

## 2023-01-08 ASSESSMENT — ENCOUNTER SYMPTOMS: COLOR CHANGE: 1

## 2023-01-08 ASSESSMENT — PAIN DESCRIPTION - DESCRIPTORS: DESCRIPTORS: ACHING

## 2023-01-08 ASSESSMENT — PAIN DESCRIPTION - PAIN TYPE: TYPE: ACUTE PAIN

## 2023-01-08 ASSESSMENT — PAIN - FUNCTIONAL ASSESSMENT
PAIN_FUNCTIONAL_ASSESSMENT: ACTIVITIES ARE NOT PREVENTED
PAIN_FUNCTIONAL_ASSESSMENT: 0-10
PAIN_FUNCTIONAL_ASSESSMENT: 0-10

## 2023-01-08 ASSESSMENT — PAIN DESCRIPTION - LOCATION: LOCATION: ANKLE

## 2023-01-08 ASSESSMENT — PAIN DESCRIPTION - FREQUENCY: FREQUENCY: INTERMITTENT

## 2023-01-08 ASSESSMENT — PAIN DESCRIPTION - ORIENTATION: ORIENTATION: LEFT

## 2023-01-08 ASSESSMENT — PAIN SCALES - GENERAL
PAINLEVEL_OUTOF10: 3
PAINLEVEL_OUTOF10: 5

## 2023-01-08 NOTE — DISCHARGE INSTRUCTIONS
RICE: Rest, Ice, Compression & Elevation to decrease swelling to the affected area and help control pain. Use the NSAIDs as prescribed for mild to moderate pain. Do ROM ABC activities like we discussed.

## 2023-01-08 NOTE — ED NOTES
I have reviewed discharge instructions with the patient. The patient verbalized understanding. Patient left ED via Discharge Method: ambulatory to Home with Self. Opportunity for questions and clarification provided. Patient given 1 scripts. To continue your aftercare when you leave the hospital, you may receive an automated call from our care team to check in on how you are doing. This is a free service and part of our promise to provide the best care and service to meet your aftercare needs.  If you have questions, or wish to unsubscribe from this service please call [phone]. Thank you for Choosing our [facility] Emergency Department.        Ramos Uriarte RN  01/08/23 [id number]

## 2023-01-08 NOTE — ED PROVIDER NOTES
Emergency Department Provider Note                   PCP:                SOFÍA Mccray NP               Age: 27 y.o. Sex: female       ICD-10-CM    1. Contusion of left ankle, initial encounter  S90. 02XA           DISPOSITION Decision To Discharge 01/08/2023 08:02:22 AM       Medical Decision Making  Sprain, strain, tendon injury, contusion,    Abrasion, laceration, neurovascular injury, foreign body    Fracture, open fracture, dislocation, joint separation, articular surface injury,      Amount and/or Complexity of Data Reviewed  Radiology: ordered and independent interpretation performed. Complexity of Problem: 1 acute, uncomplicated illness or injury. (3)    I have conducted an independent ordering and review of X-rays. Considerations: Shared decision making was utilized in the care of this patient. ED Course as of 01/08/23 0803   [facility] Jan 08, 2023   3015 Patient states that she has a history of tachycardia and that is not unusual for her. [KH]   0801 No evidence acute fracture appreciated on x-ray. I talked to the patient regarding the presentation mechanism and findings and we discussed rehabilitation using the RICE technique and range of motion exercises. Patient is agreeable to plan. [KH]      ED Course User Index  [KH] Mirta Anand DO        Orders Placed This Encounter   Procedures    XR ANKLE LEFT (MIN 3 VIEWS)        Medications - No data to display    New Prescriptions    DICLOFENAC SODIUM (VOLTAREN) 1 % GEL    Apply 2 g topically 2 times daily as needed for Pain Apply to the affected area        Sandy Alejandre is a 27 y.o. female who presents to the Emergency Department with chief complaint of    Chief Complaint   Patient presents with    Ankle Pain      Patient is a pleasant 24-year-old female presenting to the emergency department a complaining of left ankle pain.   The patient states she was in the garage when a tabletop slid off and hit her on the left ankle laterally. Patient states she has been walking on it since this happened at 3:00 yesterday but has pain with it. Patient did try to get it elevated put ice on it to decrease swelling. No other acute complaints or concerns. Patient has been taking Advil for pain control. Review of Systems   Constitutional: Negative. Musculoskeletal:  Positive for gait problem and joint swelling. Skin:  Positive for color change. Hematological: Negative.       Past Medical History:   Diagnosis Date    Abnormal Pap smear of cervix 7/28/2016    Acalculous cholecystitis 3/3/2016    resolved with surgery     Adult ADHD 7/28/2016    Anxiety     Bowel trouble 7/28/2016    Depression     GERD (gastroesophageal reflux disease)     controlled with med     H/O seasonal allergies     History of prior ablation treatment     pelvic    History of recurrent UTIs 7/28/2016    Insomnia 6/24/2016    Interstitial cystitis 2017    Obese     bmi =29.9        Past Surgical History:   Procedure Laterality Date    CHOLECYSTECTOMY  3/16    COLPOSCOPY      ENDOMETRIAL ABLATION      D&C    OTHER SURGICAL HISTORY  12/2016    CYSTOSCOPY WITH HYDRODISTENTION    TUBAL LIGATION  8/2012    WISDOM TOOTH EXTRACTION          Family History   Problem Relation Age of Onset    Ovarian Cancer Sister     Ovarian Cancer Mother     Breast Cancer Mother     Colon Cancer Maternal Grandmother     Thyroid Disease Mother         Social History     Socioeconomic History    Marital status:      Spouse name: None    Number of children: None    Years of education: None    Highest education level: None   Tobacco Use    Smoking status: Never    Smokeless tobacco: Never   Substance and Sexual Activity    Alcohol use: No    Drug use: No        Allergies: Promethazine    Previous Medications    BUSPIRONE (BUSPAR) 10 MG TABLET    Take 10 mg by mouth 3 times daily    CYCLOBENZAPRINE (FLEXERIL) 10 MG TABLET    Take 10 mg by mouth    LISDEXAMFETAMINE DIMESYLATE 60 MG CAPS    Take 60 mg by mouth daily. OMEPRAZOLE (PRILOSEC) 40 MG DELAYED RELEASE CAPSULE    Take 40 mg by mouth daily    ONDANSETRON (ZOFRAN) 8 MG TABLET    Take 8 mg by mouth every 8 hours as needed    VORTIOXETINE (TRINTELLIX) 10 MG TABS TABLET    Take 10 mg by mouth daily    ZOLPIDEM (AMBIEN) 10 MG TABLET    Take 10 mg by mouth. Vitals signs and nursing note reviewed. Patient Vitals for the past 4 hrs:   Temp Pulse Resp BP SpO2   01/08/23 0726 97.6 °F (36.4 °C) (!) 121 19 (!) 148/74 97 %          Physical Exam     GENERAL:The patient has Body mass index is 40.74 kg/m². Well-hydrated. No acute distress. VITAL SIGNS: Heart rate, blood pressure, respiratory rate reviewed as recorded in  nurse's notes  EYES: Pupils reactive. Extraocular motion intact. No conjunctival redness or drainage. LUNGS: No accessory muscle use  CARDIOVASCULAR: Regular rate and rhythm  EXTREMITIES: Bruising noted over the left lateral malleoli. Tenderness palpation over this area. No obvious deformity appreciated. Patient has swelling in the area noted. No pain or tenderness to the forefoot on the left. The patient's dorsalis pedis posterior tib pulses are easily present and capillary fill is brisk. No decrease sensation to medial lateral dorsal left foot and she is able to flex and extend the toes without any difficulty. NEUROLOGIC: Cranial nerve exam reveals face is symmetrical, tongue is midline  speech is clear. No focal deficits noted  SKIN: No rash or petechiae. Good skin turgor palpated. PSYCHIATRIC: Alert and oriented. Appropriate behavior and judgment. Procedures      No results found for any visits on 01/08/23. XR ANKLE LEFT (MIN 3 VIEWS)    (Results Pending)                         Voice dictation software was used during the making of this note. This software is not perfect and grammatical and other typographical errors may be present. This note has not been completely proofread for errors. Robert Waters, DO  01/08/23 6075

## 2023-01-08 NOTE — ED TRIAGE NOTES
Patient presents to ED c/o left foot pain. A table top fell on top of her left foot yesterday. She is currently limping slightly. Took Ibuprofen Scrape and bruising noted on lateral side of ankle.

## 2023-01-16 RX ORDER — ERGOCALCIFEROL 1.25 MG/1
50000 CAPSULE ORAL WEEKLY
COMMUNITY
Start: 2023-01-03

## 2023-01-16 RX ORDER — TIRZEPATIDE 2.5 MG/.5ML
2.5 INJECTION, SOLUTION SUBCUTANEOUS
COMMUNITY
Start: 2023-01-05

## 2023-01-16 RX ORDER — FLUOXETINE 20 MG/1
20 TABLET ORAL DAILY
COMMUNITY
Start: 2022-07-25

## 2023-01-16 RX ORDER — PRAVASTATIN SODIUM 20 MG
TABLET ORAL
COMMUNITY
Start: 2023-01-03

## 2023-01-16 RX ORDER — ERGOCALCIFEROL 1.25 MG/1
CAPSULE ORAL
COMMUNITY
Start: 2023-01-03

## 2023-01-16 RX ORDER — FLUOXETINE 20 MG/1
TABLET, FILM COATED ORAL
COMMUNITY
Start: 2022-10-26

## 2023-01-16 RX ORDER — LISDEXAMFETAMINE DIMESYLATE 30 MG/1
CAPSULE ORAL
COMMUNITY
Start: 2022-11-09

## 2023-01-16 RX ORDER — CIMETIDINE 400 MG/1
400 TABLET, FILM COATED ORAL 3 TIMES DAILY
COMMUNITY
Start: 2021-10-27

## 2023-01-16 RX ORDER — SOLIFENACIN SUCCINATE 10 MG/1
TABLET, FILM COATED ORAL
COMMUNITY
Start: 2023-01-05

## 2023-01-18 ENCOUNTER — OFFICE VISIT (OUTPATIENT)
Dept: ORTHOPEDIC SURGERY | Age: 33
End: 2023-01-18

## 2023-01-18 DIAGNOSIS — S90.02XA CONTUSION OF LEFT ANKLE, INITIAL ENCOUNTER: Primary | ICD-10-CM

## 2023-01-18 RX ORDER — METHYLPREDNISOLONE 4 MG/1
TABLET ORAL
Qty: 1 KIT | Refills: 1 | Status: SHIPPED | OUTPATIENT
Start: 2023-01-18 | End: 2023-01-24

## 2023-01-18 NOTE — PROGRESS NOTES
The patient was prescribed a walker boot for the patient's left foot. The patient wears a size NA shoe and I fitted them with a M size boot. The patient was fitted and instructed on the use of prescribed walker boot. I explained how to fit themselves and that the plastic flexible piece should always be on the front of the boot and secured by the Velcro straps on top. The air bladder in the boot was adjusted according to proper fit and comfort. The patient walked a short distance and acknowledged satisfaction with current fit. I also explained that they need a heel lift or a higher heeled shoe for the uninvolved LE to help normalize gait and avoid excessive low back stress/strain due to leg length inequality created from walker boot. Patient read and signed documenting they understand and agree to [facility]'s current DME return policy.

## 2023-01-18 NOTE — PROGRESS NOTES
Name: Fantasma Carballo  YOB: 1990  Gender: female  MRN: [id number]    Summary:   Left ankle contusion       CC: New Patient (DOI 01-07-23 Seen in [facility] ED Left ankle x-rays printed from [facility])       HPI: Fantasma Carballo is a 27 y.o. female who presents with New Patient (DOI 01-07-23 Seen in [facility] ED Left ankle x-rays printed from [facility])  . This patient presents the office to have an injury to her left ankle. She had a large table fall on her leg in her garage about 10 or 11 days ago. She was seen in the emergency room in [facility]. I reviewed her outside medical records from this. Did not give her any immobilization. She had persistent pain in the lateral ankle since that time. History was obtained by Patient     ROS/Meds/PSH/PMH/FH/SH: I personally reviewed the patients standard intake form. Below are the pertinents    Tobacco:  reports that she has never smoked. She has never used smokeless tobacco.  Diabetes: None      Physical Examination:  Exam of the left foot and ankle shows expected swelling on the lateral aspect of the ankle with some neuritic type pain. She does have a small abrasion on this consistent with her injury. Peroneal tendons are intact. Imaging:   I independently interpreted XR ordered by a different physician, taken from an outside facility left ankle shows no evidence of fracture           Eder Duarte III, MD           Assessment:   Left lateral ankle contusion    Treatment Plan:   4 This is a chronic illness/condition with exacerbation and progression  Treatment at this time: Bracing: Placed in: 3D boot and Prescription Drug Management  Studies ordered: NO XR needed @ Next Visit    Weight-bearing status: WBAT        Return to work/work restrictions: none  Medrol Dose pack (6 day oral taper): I discussed medrol being a steroid and how it can elevate blood sugars.   I recommended to monitor sugars closely and to beware of symptoms such as lethargy, excessive thirst, polyuria, and GI distress. We also discussed the dose pack taper format and how long term steroid use can alter adrenal function. I also discussed steroids effect on depression and mood. How in some people it can exacerbate underlying depression while in others create a more manic response. She will see if the boot can offer some pain relief in addition to the medication and return in 4 weeks.

## 2023-02-15 ENCOUNTER — OFFICE VISIT (OUTPATIENT)
Dept: ORTHOPEDIC SURGERY | Age: 33
End: 2023-02-15
Payer: COMMERCIAL

## 2023-02-15 DIAGNOSIS — S90.02XD CONTUSION OF LEFT ANKLE, SUBSEQUENT ENCOUNTER: Primary | ICD-10-CM

## 2023-02-15 PROCEDURE — 99213 OFFICE O/P EST LOW 20 MIN: CPT | Performed by: ORTHOPAEDIC SURGERY

## 2023-02-15 NOTE — PROGRESS NOTES
Name: Davida Carballo  YOB: 1990  Gender: female  MRN: [id number]    Summary:     Left ankle contusion     CC: Follow-up (Left ankle  no x-rays taken in office today)       HPI: Davida Carballo is a 26 y.o. female who presents with Follow-up (Left ankle  no x-rays taken in office today)  . Presents by the office today for follow-up of her left ankle contusion. This been about 4 weeks ago. She is been in a walker boot is still having some discomfort but is improving. History was obtained by Patient     ROS/Meds/PSH/PMH/FH/SH: I personally reviewed the patients standard intake form. Below are the pertinents    Tobacco:  reports that she has never smoked. She has never used smokeless tobacco.  Diabetes: None      Physical Examination:    Exam of the left foot and ankle shows less swelling and tenderness over the lateral ankle. I can range her ankle range of motion successfully now. Imaging:   No imaging reviewed           Dina Cooper III, MD           Assessment:   Left ankle lateral contusion    Treatment Plan:   3 This is stable chronic illness/condition  Treatment at this time: Physical Therapy  Studies ordered: NO XR needed @ Next Visit    Weight-bearing status: WBAT        Return to work/work restrictions: none  No medications given    She can wean out of the walker boot. She will start a home exercise program provided today in an effort to avoid physical therapy. She can return as needed.

## 2024-07-09 ENCOUNTER — TRANSCRIBE ORDERS (OUTPATIENT)
Dept: SCHEDULING | Age: 34
End: 2024-07-09

## 2024-07-09 DIAGNOSIS — Z12.31 SCREENING MAMMOGRAM FOR HIGH-RISK PATIENT: Primary | ICD-10-CM

## (undated) DEVICE — DERMABOND SKIN ADH 0.7ML -- DERMABOND ADVANCED 12/BX

## (undated) DEVICE — SUTURE MCRYL SZ 4-0 L27IN ABSRB UD L19MM PS-2 1/2 CIR PRIM Y426H

## (undated) DEVICE — TRAY CATH 16F DRN BG LTX -- CONVERT TO ITEM 363158

## (undated) DEVICE — SOLUTION IRRIG 3000ML 0.9% SOD CHL FLX CONT 0797208] ICU MEDICAL INC]

## (undated) DEVICE — STERILE SLEEVE: Brand: CONVERTORS

## (undated) DEVICE — SHEARS ENDOSCP L36CM DIA5MM ULTRASONIC CRV TIP W/ ADV

## (undated) DEVICE — BLADELESS OPTICAL TROCAR WITH FIXATION CANNULA: Brand: VERSAPORT

## (undated) DEVICE — (D)PREP SKN CHLRAPRP APPL 26ML -- CONVERT TO ITEM 371833

## (undated) DEVICE — [HIGH FLOW INSUFFLATOR,  DO NOT USE IF PACKAGE IS DAMAGED,  KEEP DRY,  KEEP AWAY FROM SUNLIGHT,  PROTECT FROM HEAT AND RADIOACTIVE SOURCES.]: Brand: PNEUMOSURE

## (undated) DEVICE — UNIVERSAL FIXATION CANNULA: Brand: VERSAONE

## (undated) DEVICE — 2, DISPOSABLE SUCTION/IRRIGATOR WITHOUT DISPOSABLE TIP: Brand: STRYKEFLOW

## (undated) DEVICE — TRAY PREP DRY W/ PREM GLV 2 APPL 6 SPNG 2 UNDPD 1 OVERWRAP

## (undated) DEVICE — SUTURE SZ 0 27IN 5/8 CIR UR-6  TAPER PT VIOLET ABSRB VICRYL J603H

## (undated) DEVICE — SINGLE BASIN: Brand: CARDINAL HEALTH

## (undated) DEVICE — SYR BULB 60ML IRRIGATION -- CONVERT TO ITEM 116413

## (undated) DEVICE — SOLUTION IV 1000ML 0.9% SOD CHL

## (undated) DEVICE — V2 BLADED TROCAR WITH FIXATION CANNULA: Brand: VERSAPORT

## (undated) DEVICE — REM POLYHESIVE ADULT PATIENT RETURN ELECTRODE: Brand: VALLEYLAB

## (undated) DEVICE — SOL ANTI-FOG 6ML MEDC -- MEDICHOICE - CONVERT TO 358427

## (undated) DEVICE — FILTER SMK EVAC FLO CLR MEGADYNE

## (undated) DEVICE — SUTURE STRATAFIX SPRL PDS + SZ 2-0 L6IN ABSRB VLT L36MM SXPP1B409

## (undated) DEVICE — 2000CC GUARDIAN II: Brand: GUARDIAN

## (undated) DEVICE — SUTURE VCRL SZ 0 L36IN ABSRB UD L36MM CT-1 1/2 CIR J946H

## (undated) DEVICE — Device

## (undated) DEVICE — DRAPE TWL SURG 16X26IN BLU ORB04] ALLCARE INC]

## (undated) DEVICE — KENDALL SCD EXPRESS SLEEVES, KNEE LENGTH, MEDIUM: Brand: KENDALL SCD